# Patient Record
Sex: MALE | Race: WHITE | HISPANIC OR LATINO | Employment: OTHER | URBAN - METROPOLITAN AREA
[De-identification: names, ages, dates, MRNs, and addresses within clinical notes are randomized per-mention and may not be internally consistent; named-entity substitution may affect disease eponyms.]

---

## 2023-07-15 ENCOUNTER — HOSPITAL ENCOUNTER (EMERGENCY)
Facility: CLINIC | Age: 23
Discharge: HOME | End: 2023-07-15
Attending: EMERGENCY MEDICINE | Admitting: EMERGENCY MEDICINE
Payer: COMMERCIAL

## 2023-07-15 VITALS
DIASTOLIC BLOOD PRESSURE: 60 MMHG | HEART RATE: 64 BPM | TEMPERATURE: 97.6 F | OXYGEN SATURATION: 100 % | RESPIRATION RATE: 14 BRPM | SYSTOLIC BLOOD PRESSURE: 115 MMHG

## 2023-07-15 DIAGNOSIS — R10.13 EPIGASTRIC PAIN: ICD-10-CM

## 2023-07-15 LAB
ALBUMIN SERPL BCG-MCNC: 4.4 G/DL (ref 3.5–5.2)
ALP SERPL-CCNC: 92 U/L (ref 40–129)
ALT SERPL W P-5'-P-CCNC: 34 U/L (ref 0–70)
ANION GAP SERPL CALCULATED.3IONS-SCNC: 10 MMOL/L (ref 7–15)
AST SERPL W P-5'-P-CCNC: 21 U/L (ref 0–45)
BASOPHILS # BLD AUTO: 0 10E3/UL (ref 0–0.2)
BASOPHILS NFR BLD AUTO: 0 %
BILIRUB SERPL-MCNC: 0.2 MG/DL
BUN SERPL-MCNC: 11.3 MG/DL (ref 6–20)
CALCIUM SERPL-MCNC: 9.2 MG/DL (ref 8.6–10)
CHLORIDE SERPL-SCNC: 103 MMOL/L (ref 98–107)
CREAT SERPL-MCNC: 0.7 MG/DL (ref 0.67–1.17)
DEPRECATED HCO3 PLAS-SCNC: 27 MMOL/L (ref 22–29)
EOSINOPHIL # BLD AUTO: 0.3 10E3/UL (ref 0–0.7)
EOSINOPHIL NFR BLD AUTO: 3 %
ERYTHROCYTE [DISTWIDTH] IN BLOOD BY AUTOMATED COUNT: 13.2 % (ref 10–15)
GFR SERPL CREATININE-BSD FRML MDRD: >90 ML/MIN/1.73M2
GLUCOSE SERPL-MCNC: 103 MG/DL (ref 70–99)
HCT VFR BLD AUTO: 42.5 % (ref 40–53)
HGB BLD-MCNC: 14.7 G/DL (ref 13.3–17.7)
IMM GRANULOCYTES # BLD: 0.1 10E3/UL
IMM GRANULOCYTES NFR BLD: 1 %
LIPASE SERPL-CCNC: 25 U/L (ref 13–60)
LYMPHOCYTES # BLD AUTO: 2.2 10E3/UL (ref 0.8–5.3)
LYMPHOCYTES NFR BLD AUTO: 22 %
MCH RBC QN AUTO: 28.5 PG (ref 26.5–33)
MCHC RBC AUTO-ENTMCNC: 34.6 G/DL (ref 31.5–36.5)
MCV RBC AUTO: 83 FL (ref 78–100)
MONOCYTES # BLD AUTO: 0.6 10E3/UL (ref 0–1.3)
MONOCYTES NFR BLD AUTO: 6 %
NEUTROPHILS # BLD AUTO: 6.9 10E3/UL (ref 1.6–8.3)
NEUTROPHILS NFR BLD AUTO: 68 %
NRBC # BLD AUTO: 0 10E3/UL
NRBC BLD AUTO-RTO: 0 /100
PLATELET # BLD AUTO: 348 10E3/UL (ref 150–450)
POTASSIUM SERPL-SCNC: 4.1 MMOL/L (ref 3.4–5.3)
PROT SERPL-MCNC: 7.5 G/DL (ref 6.4–8.3)
RBC # BLD AUTO: 5.15 10E6/UL (ref 4.4–5.9)
SODIUM SERPL-SCNC: 140 MMOL/L (ref 136–145)
WBC # BLD AUTO: 10.1 10E3/UL (ref 4–11)

## 2023-07-15 PROCEDURE — 80053 COMPREHEN METABOLIC PANEL: CPT | Performed by: EMERGENCY MEDICINE

## 2023-07-15 PROCEDURE — 83690 ASSAY OF LIPASE: CPT | Performed by: EMERGENCY MEDICINE

## 2023-07-15 PROCEDURE — 85025 COMPLETE CBC W/AUTO DIFF WBC: CPT | Performed by: EMERGENCY MEDICINE

## 2023-07-15 PROCEDURE — 99283 EMERGENCY DEPT VISIT LOW MDM: CPT

## 2023-07-15 PROCEDURE — 36415 COLL VENOUS BLD VENIPUNCTURE: CPT | Performed by: EMERGENCY MEDICINE

## 2023-07-15 ASSESSMENT — ACTIVITIES OF DAILY LIVING (ADL): ADLS_ACUITY_SCORE: 35

## 2023-07-15 NOTE — ED PROVIDER NOTES
History     Chief Complaint:  Abdominal Pain       HPI   Julio Stoll is a 22 year old male who presents with epigastric abdominal pain.  He states the pain began around 2300 tonight.  He initially had some nausea with the discomfort.  His mother gave him a dose of omeprazole but given the pain he came to the ER.  He currently states his pain is completely resolved.  He has not had any fevers, difficulty urinating, or change in bowel movements.  He states that he ate a [employer] for dinner tonight.      Allergies:  No Known Allergies     Medications:    No current outpatient medications on file.      Past Medical History:    History reviewed. No pertinent past medical history.    Past Surgical History:    History reviewed. No pertinent surgical history.     Family History:    family history is not on file.    Social History:   reports that he has never smoked. He has quit using smokeless tobacco.  PCP: No Ref-Primary, Physician     Physical Exam   Patient Vitals for the past 24 hrs:   BP Temp Temp src Pulse Resp SpO2   07/15/23 0129 114/74 97.6  F (36.4  C) Temporal 56 14 100 %        Physical Exam  General: Appears well-developed and well-nourished.   Head: No signs of trauma.   CV: Normal rate and regular rhythm.    Resp: Effort normal and breath sounds normal. No respiratory distress.   GI: Soft. There is no tenderness.  No rebound or guarding.  Normal bowel sounds.  No CVA tenderness.  MSK: Normal range of motion.   Neuro: The patient is alert and oriented. Sensation normal. Speech normal.  Skin: Skin is warm and dry. No rash noted.   Psych: normal mood and affect. behavior is normal.       Emergency Department Course   Laboratory:  Labs Ordered and Resulted from Time of ED Arrival to Time of ED Departure   COMPREHENSIVE METABOLIC PANEL - Abnormal       Result Value    Sodium 140      Potassium 4.1      Chloride 103      Carbon Dioxide (CO2) 27      Anion Gap 10      Urea Nitrogen 11.3      Creatinine  0.70      Calcium 9.2      Glucose 103 (*)     Alkaline Phosphatase 92      AST 21      ALT 34      Protein Total 7.5      Albumin 4.4      Bilirubin Total 0.2      GFR Estimate >90     LIPASE - Normal    Lipase 25     CBC WITH PLATELETS AND DIFFERENTIAL    WBC Count 10.1      RBC Count 5.15      Hemoglobin 14.7      Hematocrit 42.5      MCV 83      MCH 28.5      MCHC 34.6      RDW 13.2      Platelet Count 348      % Neutrophils 68      % Lymphocytes 22      % Monocytes 6      % Eosinophils 3      % Basophils 0      % Immature Granulocytes 1      NRBCs per 100 WBC 0      Absolute Neutrophils 6.9      Absolute Lymphocytes 2.2      Absolute Monocytes 0.6      Absolute Eosinophils 0.3      Absolute Basophils 0.0      Absolute Immature Granulocytes 0.1      Absolute NRBCs 0.0            Emergency Department Course & Assessments:    Interventions:  Medications - No data to display       Social Determinants of Health affecting care:   None    Disposition:  The patient was discharged to home.     Impression & Plan    Medical Decision Making:  Julio Stoll is a 22 year old male who presents with epigastric abdominal pain.  He reports that the pain began around 2300 tonight with some nausea but no vomiting.  His mother gave him a dose of omeprazole.  He states that his pain has currently completely resolved.  On my evaluation he had a benign abdominal exam with no pain with palpation throughout.  Blood work was obtained that was reassuring.  I considered a CT scan, but given the reassuring physical exam and blood work, I did not feel that this was indicated.  I did discuss that his symptoms could be related to gastritis, but there is no test for this.  I recommended he continue with the omeprazole for few more days and have a simple diet avoiding fatty foods, spicy foods, and alcohol.  Recommended he follow with his doctor in clinic and return to the ER for any further concerns.      Diagnosis:    ICD-10-CM    1.  Epigastric pain  R10.13            Discharge Medications:  New Prescriptions    No medications on file           Dilan Orozco MD  07/15/23 0330

## 2023-07-15 NOTE — DISCHARGE INSTRUCTIONS
Please take the omeprazole once per day for the next week.  Return to the ER for worsening pain, fevers, or any further concerns.

## 2023-07-15 NOTE — ED TRIAGE NOTES
Pt reports upper gastric pain about 35 minutes ago that feels like its being squeezed. Pt rates pain a 6/10.     Triage Assessment     Row Name 07/15/23 0132       Triage Assessment (Adult)    Airway WDL WDL       Respiratory WDL    Respiratory WDL WDL       Skin Circulation/Temperature WDL    Skin Circulation/Temperature WDL WDL       Cardiac WDL    Cardiac WDL WDL       Peripheral/Neurovascular WDL    Peripheral Neurovascular WDL WDL       Cognitive/Neuro/Behavioral WDL    Cognitive/Neuro/Behavioral WDL WDL

## 2024-06-24 ENCOUNTER — APPOINTMENT (OUTPATIENT)
Dept: GENERAL RADIOLOGY | Facility: CLINIC | Age: 24
End: 2024-06-24
Attending: EMERGENCY MEDICINE
Payer: OTHER GOVERNMENT

## 2024-06-24 ENCOUNTER — HOSPITAL ENCOUNTER (EMERGENCY)
Facility: CLINIC | Age: 24
Discharge: HOME | End: 2024-06-25
Attending: EMERGENCY MEDICINE | Admitting: EMERGENCY MEDICINE
Payer: OTHER GOVERNMENT

## 2024-06-24 DIAGNOSIS — R07.9 CHEST PAIN, UNSPECIFIED TYPE: ICD-10-CM

## 2024-06-24 LAB
ALBUMIN SERPL BCG-MCNC: 4.4 G/DL (ref 3.5–5.2)
ALP SERPL-CCNC: 75 U/L (ref 40–150)
ALT SERPL W P-5'-P-CCNC: 17 U/L (ref 0–70)
ANION GAP SERPL CALCULATED.3IONS-SCNC: 7 MMOL/L (ref 7–15)
AST SERPL W P-5'-P-CCNC: 16 U/L (ref 0–45)
ATRIAL RATE - MUSE: 58 BPM
BASOPHILS # BLD AUTO: 0.1 10E3/UL (ref 0–0.2)
BASOPHILS NFR BLD AUTO: 1 %
BILIRUB SERPL-MCNC: <0.2 MG/DL
BUN SERPL-MCNC: 15.7 MG/DL (ref 6–20)
CALCIUM SERPL-MCNC: 9.4 MG/DL (ref 8.6–10)
CHLORIDE SERPL-SCNC: 104 MMOL/L (ref 98–107)
CREAT SERPL-MCNC: 1.08 MG/DL (ref 0.67–1.17)
DEPRECATED HCO3 PLAS-SCNC: 29 MMOL/L (ref 22–29)
DIASTOLIC BLOOD PRESSURE - MUSE: NORMAL MMHG
EGFRCR SERPLBLD CKD-EPI 2021: >90 ML/MIN/1.73M2
EOSINOPHIL # BLD AUTO: 0.5 10E3/UL (ref 0–0.7)
EOSINOPHIL NFR BLD AUTO: 6 %
ERYTHROCYTE [DISTWIDTH] IN BLOOD BY AUTOMATED COUNT: 13.1 % (ref 10–15)
GLUCOSE SERPL-MCNC: 90 MG/DL (ref 70–99)
HCT VFR BLD AUTO: 41.5 % (ref 40–53)
HGB BLD-MCNC: 14 G/DL (ref 13.3–17.7)
IMM GRANULOCYTES # BLD: 0 10E3/UL
IMM GRANULOCYTES NFR BLD: 0 %
INTERPRETATION ECG - MUSE: NORMAL
LIPASE SERPL-CCNC: 34 U/L (ref 13–60)
LYMPHOCYTES # BLD AUTO: 3.7 10E3/UL (ref 0.8–5.3)
LYMPHOCYTES NFR BLD AUTO: 49 %
MCH RBC QN AUTO: 29.1 PG (ref 26.5–33)
MCHC RBC AUTO-ENTMCNC: 33.7 G/DL (ref 31.5–36.5)
MCV RBC AUTO: 86 FL (ref 78–100)
MONOCYTES # BLD AUTO: 0.7 10E3/UL (ref 0–1.3)
MONOCYTES NFR BLD AUTO: 9 %
NEUTROPHILS # BLD AUTO: 2.7 10E3/UL (ref 1.6–8.3)
NEUTROPHILS NFR BLD AUTO: 35 %
NRBC # BLD AUTO: 0 10E3/UL
NRBC BLD AUTO-RTO: 0 /100
P AXIS - MUSE: 52 DEGREES
PLATELET # BLD AUTO: 302 10E3/UL (ref 150–450)
POTASSIUM SERPL-SCNC: 4 MMOL/L (ref 3.4–5.3)
PR INTERVAL - MUSE: 172 MS
PROT SERPL-MCNC: 7.2 G/DL (ref 6.4–8.3)
QRS DURATION - MUSE: 86 MS
QT - MUSE: 408 MS
QTC - MUSE: 400 MS
R AXIS - MUSE: 83 DEGREES
RBC # BLD AUTO: 4.81 10E6/UL (ref 4.4–5.9)
SODIUM SERPL-SCNC: 140 MMOL/L (ref 135–145)
SYSTOLIC BLOOD PRESSURE - MUSE: NORMAL MMHG
T AXIS - MUSE: 53 DEGREES
TROPONIN T SERPL HS-MCNC: <6 NG/L
VENTRICULAR RATE- MUSE: 58 BPM
WBC # BLD AUTO: 7.7 10E3/UL (ref 4–11)

## 2024-06-24 PROCEDURE — 85025 COMPLETE CBC W/AUTO DIFF WBC: CPT | Performed by: EMERGENCY MEDICINE

## 2024-06-24 PROCEDURE — 36415 COLL VENOUS BLD VENIPUNCTURE: CPT | Performed by: EMERGENCY MEDICINE

## 2024-06-24 PROCEDURE — 93005 ELECTROCARDIOGRAM TRACING: CPT

## 2024-06-24 PROCEDURE — 84484 ASSAY OF TROPONIN QUANT: CPT | Performed by: EMERGENCY MEDICINE

## 2024-06-24 PROCEDURE — 80053 COMPREHEN METABOLIC PANEL: CPT | Performed by: EMERGENCY MEDICINE

## 2024-06-24 PROCEDURE — 83690 ASSAY OF LIPASE: CPT | Performed by: EMERGENCY MEDICINE

## 2024-06-24 PROCEDURE — 99285 EMERGENCY DEPT VISIT HI MDM: CPT | Mod: 25

## 2024-06-24 PROCEDURE — 96374 THER/PROPH/DIAG INJ IV PUSH: CPT

## 2024-06-24 PROCEDURE — 71046 X-RAY EXAM CHEST 2 VIEWS: CPT

## 2024-06-24 PROCEDURE — 82040 ASSAY OF SERUM ALBUMIN: CPT | Performed by: EMERGENCY MEDICINE

## 2024-06-24 ASSESSMENT — ACTIVITIES OF DAILY LIVING (ADL)
ADLS_ACUITY_SCORE: 35
ADLS_ACUITY_SCORE: 35

## 2024-06-24 ASSESSMENT — COLUMBIA-SUICIDE SEVERITY RATING SCALE - C-SSRS
6. HAVE YOU EVER DONE ANYTHING, STARTED TO DO ANYTHING, OR PREPARED TO DO ANYTHING TO END YOUR LIFE?: NO
2. HAVE YOU ACTUALLY HAD ANY THOUGHTS OF KILLING YOURSELF IN THE PAST MONTH?: NO
1. IN THE PAST MONTH, HAVE YOU WISHED YOU WERE DEAD OR WISHED YOU COULD GO TO SLEEP AND NOT WAKE UP?: NO

## 2024-06-25 VITALS
OXYGEN SATURATION: 100 % | TEMPERATURE: 97.4 F | SYSTOLIC BLOOD PRESSURE: 125 MMHG | HEART RATE: 53 BPM | DIASTOLIC BLOOD PRESSURE: 78 MMHG | RESPIRATION RATE: 20 BRPM

## 2024-06-25 LAB — TROPONIN T SERPL HS-MCNC: <6 NG/L

## 2024-06-25 PROCEDURE — 84484 ASSAY OF TROPONIN QUANT: CPT | Performed by: EMERGENCY MEDICINE

## 2024-06-25 PROCEDURE — 250N000013 HC RX MED GY IP 250 OP 250 PS 637: Performed by: EMERGENCY MEDICINE

## 2024-06-25 PROCEDURE — 250N000011 HC RX IP 250 OP 636: Mod: JZ | Performed by: EMERGENCY MEDICINE

## 2024-06-25 PROCEDURE — 36415 COLL VENOUS BLD VENIPUNCTURE: CPT | Performed by: EMERGENCY MEDICINE

## 2024-06-25 RX ORDER — OMEPRAZOLE 40 MG/1
40 CAPSULE, DELAYED RELEASE ORAL DAILY
Qty: 14 CAPSULE | Refills: 0 | Status: SHIPPED | OUTPATIENT
Start: 2024-06-25 | End: 2024-07-09

## 2024-06-25 RX ORDER — ACETAMINOPHEN 500 MG
1000 TABLET ORAL ONCE
Status: COMPLETED | OUTPATIENT
Start: 2024-06-25 | End: 2024-06-25

## 2024-06-25 RX ORDER — MAGNESIUM HYDROXIDE/ALUMINUM HYDROXICE/SIMETHICONE 120; 1200; 1200 MG/30ML; MG/30ML; MG/30ML
15 SUSPENSION ORAL ONCE
Status: COMPLETED | OUTPATIENT
Start: 2024-06-25 | End: 2024-06-25

## 2024-06-25 RX ADMIN — FAMOTIDINE 20 MG: 10 INJECTION, SOLUTION INTRAVENOUS at 01:31

## 2024-06-25 RX ADMIN — ACETAMINOPHEN 1000 MG: 500 TABLET, FILM COATED ORAL at 01:31

## 2024-06-25 RX ADMIN — ALUMINUM HYDROXIDE, MAGNESIUM HYDROXIDE, AND SIMETHICONE 15 ML: 200; 200; 20 SUSPENSION ORAL at 01:31

## 2024-06-25 ASSESSMENT — ACTIVITIES OF DAILY LIVING (ADL): ADLS_ACUITY_SCORE: 35

## 2024-06-25 NOTE — DISCHARGE INSTRUCTIONS
Your evaluation tonight is reassuring.  It is possible this is related to stomach acid, although there is no test for this in the emergency department.  If the symptoms continue, please follow-up with your doctor in clinic.  Return to the ER for worsening symptoms or further concerns.

## 2024-06-25 NOTE — ED TRIAGE NOTES
Patient presents to the ER for complaints of left sided chest pain that he woke up with. Patient states that it is 8/10 and has not improved even with OTC medications. Endorses S[facility]O.      Triage Assessment (Adult)       Row Name 06/24/24 2465          Triage Assessment    Airway WDL WDL        Respiratory WDL    Respiratory WDL X;rhythm/pattern     Rhythm/Pattern, Respiratory dyspnea upon exertion        Skin Circulation/Temperature WDL    Skin Circulation/Temperature WDL WDL        Cardiac WDL    Cardiac WDL X        Peripheral/Neurovascular WDL    Peripheral Neurovascular WDL WDL        Cognitive/Neuro/Behavioral WDL    Cognitive/Neuro/Behavioral WDL WDL

## 2024-06-25 NOTE — ED PROVIDER NOTES
Emergency Department Note      History of Present Illness     Chief Complaint  Chest Pain    HPI  Julio tSoll is a 23 year old male who presents to the ED for chest pain. Yesterday morning, the patient woke up with chest pain that he localizes to the left side of his chest, underneath his pectoral. He felt fine when he went to bed on Sunday. The pain was most severe in the morning and has subsided a bit throughout the day. He took some Advil at 1130 with no relief. The pain does not change with eating. Patient denies cough, congestion, or sore throat. No reported allergies.     Independent Historian  None    Review of External Notes  None  Past Medical History   Medical History and Problem List  No other significant past medical history or family history.    Medications  The patient is currently on no regular medications.    Physical Exam   Patient Vitals for the past 24 hrs:   BP Temp Temp src Pulse Resp SpO2   06/25/24 0116 -- -- -- -- -- 100 %   06/25/24 0115 125/78 -- -- 53 -- --   06/24/24 2149 129/51 -- -- 54 20 100 %   06/24/24 2147 -- 97.4  F (36.3  C) Temporal -- -- --     Physical Exam  General: Appears well-developed and well-nourished.   Head: No signs of trauma.   CV: Normal rate and regular rhythm.    Resp: Effort normal and breath sounds normal. No respiratory distress.   GI: Soft. There is no tenderness.  No rebound or guarding.  Normal bowel sounds.  MSK: Normal range of motion. no edema. No Calf tenderness.  Neuro: The patient is alert and oriented. Speech normal.  Skin: Skin is warm and dry. No rash noted.   Psych: normal mood and affect. behavior is normal.     Diagnostics   Lab Results   Labs Ordered and Resulted from Time of ED Arrival to Time of ED Departure   COMPREHENSIVE METABOLIC PANEL - Normal       Result Value    Sodium 140      Potassium 4.0      Carbon Dioxide (CO2) 29      Anion Gap 7      Urea Nitrogen 15.7      Creatinine 1.08      GFR Estimate >90      Calcium 9.4       Chloride 104      Glucose 90      Alkaline Phosphatase 75      AST 16      ALT 17      Protein Total 7.2      Albumin 4.4      Bilirubin Total <0.2     TROPONIN T, HIGH SENSITIVITY - Normal    Troponin T, High Sensitivity <6     LIPASE - Normal    Lipase 34     CBC WITH PLATELETS AND DIFFERENTIAL    WBC Count 7.7      RBC Count 4.81      Hemoglobin 14.0      Hematocrit 41.5      MCV 86      MCH 29.1      MCHC 33.7      RDW 13.1      Platelet Count 302      % Neutrophils 35      % Lymphocytes 49      % Monocytes 9      % Eosinophils 6      % Basophils 1      % Immature Granulocytes 0      NRBCs per 100 WBC 0      Absolute Neutrophils 2.7      Absolute Lymphocytes 3.7      Absolute Monocytes 0.7      Absolute Eosinophils 0.5      Absolute Basophils 0.1      Absolute Immature Granulocytes 0.0      Absolute NRBCs 0.0       Imaging  XR Chest 2 Views   Final Result   IMPRESSION: Negative chest.        EKG   ECG taken at 2150, ECG read at 0120  Sinus bradycardia  Otherwise normal ECG   Rate 58 bpm. HI interval 172 ms. QRS duration 86 ms. QT/QTc 408/400 ms. P-R-T axes 52 83 53.    Independent Interpretation  On my independent interpretation of CXR there is no pneumothorax    ED Course    Medications Administered  Medications   acetaminophen (TYLENOL) tablet 1,000 mg (1,000 mg Oral $Given 6/25/24 0131)   alum & mag hydroxide-simethicone (MAALOX) suspension 15 mL (15 mLs Oral $Given 6/25/24 0131)   famotidine (PEPCID) injection 20 mg (20 mg Intravenous $Given 6/25/24 0131)       Discussion of Management  None    Social Determinants of Health adding to complexity of care  None    ED Course  ED Course as of 06/25/24 0454   Tue Jun 25, 2024   0120 I obtained history and examined the patient as noted above.       Medical Decision Making / Diagnosis   JONNY  Julio Stoll is a 23 year old male presents with chest pain.  States he had chest pain throughout the day.  On my evaluation he had a reassuring physical exam and vitals.   EKG did not show any concerning ST segment changes or arrhythmias and blood work was obtained including a troponin which was undetectable.  I did obtain abdominal labs as well which were reassuring.  Patient is low risk Wells and PERC negative.  He was given above medications and did feel better.  I did discuss with the patient that this time the exact cause of his symptoms is not clear, but at this point I do not see any emergent or life-threatening process.  I discussed that trying supportive care and recommended follow-up in clinic if the symptoms persist.  Return precautions were discussed.    Disposition  The patient was discharged.     ICD-10 Codes:    ICD-10-CM    1. Chest pain, unspecified type  R07.9            Discharge Medications  Discharge Medication List as of 6/25/2024  1:35 AM        START taking these medications    Details   omeprazole (PRILOSEC) 40 MG DR capsule Take 1 capsule (40 mg) by mouth daily for 14 days, Disp-14 capsule, R-0, E-Prescribe           Scribe Disclosure:  Thanh CREWS, am serving as a scribe at 1:19 AM on 6/25/2024 to document services personally performed by Dilan Orozco MD based on my observations and the provider's statements to me.        Dilan Orozco MD  06/25/24 0630

## 2024-07-23 ENCOUNTER — OFFICE VISIT (OUTPATIENT)
Dept: FAMILY MEDICINE | Facility: CLINIC | Age: 24
End: 2024-07-23
Payer: OTHER GOVERNMENT

## 2024-07-23 VITALS
RESPIRATION RATE: 16 BRPM | DIASTOLIC BLOOD PRESSURE: 79 MMHG | OXYGEN SATURATION: 97 % | HEART RATE: 65 BPM | HEIGHT: 68 IN | SYSTOLIC BLOOD PRESSURE: 125 MMHG | BODY MASS INDEX: 23.84 KG/M2 | WEIGHT: 157.3 LBS | TEMPERATURE: 99 F

## 2024-07-23 DIAGNOSIS — K59.00 CONSTIPATION, UNSPECIFIED CONSTIPATION TYPE: ICD-10-CM

## 2024-07-23 DIAGNOSIS — K21.9 GASTROESOPHAGEAL REFLUX DISEASE WITHOUT ESOPHAGITIS: Primary | ICD-10-CM

## 2024-07-23 PROCEDURE — 99213 OFFICE O/P EST LOW 20 MIN: CPT

## 2024-07-23 RX ORDER — SENNA AND DOCUSATE SODIUM 50; 8.6 MG/1; MG/1
1 TABLET, FILM COATED ORAL DAILY PRN
Qty: 30 TABLET | Refills: 0 | Status: SHIPPED | OUTPATIENT
Start: 2024-07-23 | End: 2024-08-21

## 2024-07-23 RX ORDER — POLYETHYLENE GLYCOL 3350 17 G/17G
17 POWDER, FOR SOLUTION ORAL DAILY
Qty: 510 G | Refills: 0 | Status: SHIPPED | OUTPATIENT
Start: 2024-07-23 | End: 2024-08-21

## 2024-07-23 ASSESSMENT — PAIN SCALES - GENERAL: PAINLEVEL: MODERATE PAIN (4)

## 2024-07-23 NOTE — PROGRESS NOTES
Assessment & Plan     (K21.9) Gastroesophageal reflux disease without esophagitis  (primary encounter diagnosis)  Comment: Acute and stable.  No concerns for acute airway, vital signs stable, no concerns for esophagitis or hemorrhagic changes.  Patient's presentation is classic for GERD with epigastric cramping and discomfort and burning sensation in chest and throat.  Some possibility that he is also experiencing a minor ulcer, but PPI therapy will help to manage this as well.  Will begin with increased dosing of 40 mg once daily for 2 weeks, and then reduce dosing to 20 mg once daily for the next month.  Therapy will help to manage this as well.  Will begin with increased dosing of 40 mg once daily for 2 weeks, and then reduce dosing to 20 mg once daily for the next month.  Educated patient that I would like him to follow-up to establish care with a PCP at that time, as this will be a good time to discuss the possibility for discontinuation of the medication versus more ongoing treatment. Offered education on medications including appropriate dosing, possible side effects, and possible adverse effects.  Education given on return to clinic instructions as well as alarm signs that would require the need for immediate medical attention.  Patient attested to understanding.  Educated patient on some foods that can aggravate reflux which include spicy foods, fried foods, greasy foods, and alcohol.  Patient attested to working towards avoiding these foods.  Plan: omeprazole (PRILOSEC) 20 MG DR capsule    (K59.00) Constipation, unspecified constipation type  Comment: Acute and stable.  No concerns for acute abdomen or bowel perforation.  No significant lower abdominal discomfort, but patient notes that he feels he has to strain more often to pass a bowel movement recently.  Will begin with MiraLAX and increase dietary fiber, but also sent patient with senna to entice a daily bowel movement if more conservative plan of  care is not beneficial. Offered education on medications including appropriate dosing, possible side effects, and possible adverse effects.  Education given on return to clinic instructions as well as alarm signs that would require the need for immediate medical attention.  Patient attested to understanding.  Plan: SENNA-docusate sodium (SENNA S) 8.6-50 MG         tablet, polyethylene glycol (MIRALAX) 17         GM/Dose powder      Prescription drug management  I spent a total of 15 minutes on the day of the visit.   Time spent by me doing chart review, history and exam, documentation and further activities per the note    FUTURE APPOINTMENTS:       - Follow-up visit in 1 month to establish care and recheck GI symptoms       - Follow-up for annual visit or as needed  Patient Instructions   To reduce acid production and start healing the stomach, take Omeprazole 40mg daily for the next 2 weeks. After that you can take 20mg once daily for the next month. This medication must be taken 30 MINUTES BEFORE a meal.  Your symptoms should improve greatly over the next 48-72 hours.  For your ongoing symptoms, please use Maalox every 4 hours as needed.  I recommend you follow up with your provider in two weeks if symptoms have not completely resolved.      Please take your medicines as recommended above and review the discharge instructions for concerning signs/symptoms that would require your prompt return to the clinic or emergency department for further evaluation. This would include severe worsening of pain, difficulty swallowing, vomiting, bloody vomit. Please follow up in clinic as I have recommended.  If your symptoms worsen prior to your follow up appointment, do not hesitate to return to clinic or go to an Emergency Department      For constipation:   1. Consume at least 8 glasses of water per day   2. Exercise for at least 30 minutes every day. Regular exercise helps to keep your digestive system active and and  healthy and may help with constipation.   3. Take advantage of opportunities - you are more like to have a bowel movement after waking and after eating. Do not postpone having a bowel movement if you feel the urge to go.   4. Increase dietary fiber. Goal of 25 grams per day for women, 35 grams per day for men. If unable to consume 25-35 grams through diet alone consider OTC supplements. Metamucil is the best choice. It requires the use of plenty of water to be effective. Can take days to weeks to take effect.   5. Prunes can be just as effective as Metamucil. 10 prunes = 6 grams of fiber.   6. Recommend taking Miralax (17 grams = 1 scoop). Take once daily, can mix with any liquid. If you experience increasing bowel movements and diarrhea, decrease to every other day or every 3rd day. Miralax is an osmotic laxative that increases the amount of water secreted by the intestines resulting in softer and easier to pass stools. It can take 1-4 days to work. It may be taken chronically if you drink enough water throughout the day.   7. If Miralax isn't enough, recommend stimulant laxative such as Senna, Ex Lax or Dulcolax. Stimulant laxatives speed up the colonic motility of your gut helping to induce a bowel movement. Take as needed at     High Fiber Foods:  Bran cereal, 1/3 cup, 8.6 gm fiber   Cooked kidney beans   cup 7.9 gm  Cooked lentils   cup 7.8 gm  Cooked black beans   cup 7.6 gm  Canned chickpeas   cup 5.3 gm  Baked beans   cup 5.2 gm  Pear 1 5.1 gm  Soybeans   cup 5.1 gm  Quinoa   cup 5 gm  Deep seeds, 1 tbsp 5 gm  Baked sweet potato, with skin 1 medium 4.8 gm  Avocado, half small 4.5 gm  Baked potato, with skin 1 medium 4.4 gm  Cooked frozen green peas   cup 4.4 gm  Bulgur   cup 4.1 gm  Cooked frozen mixed vegetables   cup 4 gm  Raspberries   cup 4 gm  Blackberries   cup 3.8 gm  Almonds 1 oz 3.5 gm  Cooked frozen spinach   cup 3.5 gm  Vegetable or soy mary 1 each 3.4 gm  Apple 1 medium 3.3 gm  Dried dates 5  pieces 3.3 gm      Subjective   Julio is a 23 year old, presenting for the following health issues:  Abdominal Pain and Recheck Medication (PT REPORTS THAT HE IS HERE TO DISCUSS HAVING PAIN IN STOMACH AREA FOR 3-4 DAYS.PT ALSO TOOK  GUT SUPPORT DIETARY SUPPLEMENT TO HELP WITH HAVING A BOWEL MOVEMENT.)        7/23/2024    11:17 AM   Additional Questions   Roomed by DORIAN   Accompanied by MOM         7/23/2024    11:17 AM   Patient Reported Additional Medications   Patient reports taking the following new medications NONE     History of Present Illness       Reason for visit:  Pain on the middle part of the stomach  Symptom onset:  3-7 days ago    He eats 2-3 servings of fruits and vegetables daily.He consumes 1 sweetened beverage(s) daily.He exercises with enough effort to increase his heart rate 60 or more minutes per day.  He exercises with enough effort to increase his heart rate 5 days per week.   Julio is a 23-year-old male with no significant past medical history who presents today accompanied by his mother with concerns for abdominal discomfort.  Patient reports that beginning on Sunday, July 21 he was having some significant epigastric cramping which resulted in a single episode of emesis.  His mom gave him some Pepto-Bismol which helped to improve the symptoms, but since that time he has continued to have epigastric cramping, and burning in his chest and throat.  He does not have a history of GERD, and declines that he has never needed medication to manage this, but does attest to the fact that he eats a significant amount of spicy foods.  Along with this, he does attest to some changes in his bowel habits.  He notes he is a once daily stool or, but notes that since he does not have a history of GERD, and declines that he has never needed medication to manage this, but does attest to the fact that he eats a significant amount of spicy foods.  Along with this, he does attest to some changes in his bowel  "habits.  He notes he is a once daily stool or, but notes that since the symptoms of epigastric pain have presented he feels he needs to strain more to pass a bowel movement.  He declines any blood in his stool or emesis, black tarry stools, lower abdominal pain, or persistent nausea and vomiting.  He feels completely well otherwise and declines any fever, chills, body aches, lightheadedness or dizziness, blurry or double vision, chest pain, shortness of breath, or general fatigue.       Review of Systems  Constitutional, HEENT, cardiovascular, pulmonary, gi and gu systems are negative, except as otherwise noted.      Objective    /79 (BP Location: Left arm, Patient Position: Sitting, Cuff Size: Adult Regular)   Pulse 65   Temp 99  F (37.2  C) (Oral)   Resp 16   Ht 1.727 m (5' 8\")   Wt 71.4 kg (157 lb 4.8 oz)   SpO2 97%   BMI 23.92 kg/m    Body mass index is 23.92 kg/m .  Physical Exam   GENERAL: alert and no distress  EYES: Eyes grossly normal to inspection, PERRL and conjunctivae and sclerae normal  HENT: ear canals and TM's normal, nose and mouth without ulcers or lesions  NECK: no adenopathy, no asymmetry, masses, or scars  RESP: lungs clear to auscultation - no rales, rhonchi or wheezes  CV: regular rate and rhythm, normal S1 S2, no S3 or S4, no murmur, click or rub, no peripheral edema  ABDOMEN: soft, nontender, no hepatosplenomegaly, no masses and bowel sounds normal  MS: no gross musculoskeletal defects noted, no edema    Juliana Tamayo DNP FNP-C  Family Nurse Practitioner - Same Day Provider  [facility] [place]        Signed Electronically by: DONNY Doshi CNP    "

## 2024-07-23 NOTE — PATIENT INSTRUCTIONS
To reduce acid production and start healing the stomach, take Omeprazole 40mg daily for the next 2 weeks. After that you can take 20mg once daily for the next month. This medication must be taken 30 MINUTES BEFORE a meal.  Your symptoms should improve greatly over the next 48-72 hours.  For your ongoing symptoms, please use Maalox every 4 hours as needed.  I recommend you follow up with your provider in two weeks if symptoms have not completely resolved.      Please take your medicines as recommended above and review the discharge instructions for concerning signs/symptoms that would require your prompt return to the clinic or emergency department for further evaluation. This would include severe worsening of pain, difficulty swallowing, vomiting, bloody vomit. Please follow up in clinic as I have recommended.  If your symptoms worsen prior to your follow up appointment, do not hesitate to return to clinic or go to an Emergency Department      For constipation:   1. Consume at least 8 glasses of water per day   2. Exercise for at least 30 minutes every day. Regular exercise helps to keep your digestive system active and and healthy and may help with constipation.   3. Take advantage of opportunities - you are more like to have a bowel movement after waking and after eating. Do not postpone having a bowel movement if you feel the urge to go.   4. Increase dietary fiber. Goal of 25 grams per day for women, 35 grams per day for men. If unable to consume 25-35 grams through diet alone consider OTC supplements. Metamucil is the best choice. It requires the use of plenty of water to be effective. Can take days to weeks to take effect.   5. Prunes can be just as effective as Metamucil. 10 prunes = 6 grams of fiber.   6. Recommend taking Miralax (17 grams = 1 scoop). Take once daily, can mix with any liquid. If you experience increasing bowel movements and diarrhea, decrease to every other day or every 3rd day. Miralax is  an osmotic laxative that increases the amount of water secreted by the intestines resulting in softer and easier to pass stools. It can take 1-4 days to work. It may be taken chronically if you drink enough water throughout the day.   7. If Miralax isn't enough, recommend stimulant laxative such as Senna, Ex Lax or Dulcolax. Stimulant laxatives speed up the colonic motility of your gut helping to induce a bowel movement. Take as needed at     High Fiber Foods:  Bran cereal, 1/3 cup, 8.6 gm fiber   Cooked kidney beans   cup 7.9 gm  Cooked lentils   cup 7.8 gm  Cooked black beans   cup 7.6 gm  Canned chickpeas   cup 5.3 gm  Baked beans   cup 5.2 gm  Pear 1 5.1 gm  Soybeans   cup 5.1 gm  Quinoa   cup 5 gm  Deep seeds, 1 tbsp 5 gm  Baked sweet potato, with skin 1 medium 4.8 gm  Avocado, half small 4.5 gm  Baked potato, with skin 1 medium 4.4 gm  Cooked frozen green peas   cup 4.4 gm  Bulgur   cup 4.1 gm  Cooked frozen mixed vegetables   cup 4 gm  Raspberries   cup 4 gm  Blackberries   cup 3.8 gm  Almonds 1 oz 3.5 gm  Cooked frozen spinach   cup 3.5 gm  Vegetable or soy mary 1 each 3.4 gm  Apple 1 medium 3.3 gm  Dried dates 5 pieces 3.3 gm

## 2024-07-23 NOTE — LETTER
July 23, 2024      Hari Jerman  [address] [address]        To Whom It May Concern:    Julio Stoll  was seen on 7/23/2024.  Please excuse him from missed hours of work today due to illness.        Sincerely,        DONNY Doshi CNP

## 2024-08-21 ENCOUNTER — OFFICE VISIT (OUTPATIENT)
Dept: FAMILY MEDICINE | Facility: CLINIC | Age: 24
End: 2024-08-21
Payer: OTHER GOVERNMENT

## 2024-08-21 VITALS
HEIGHT: 68 IN | HEART RATE: 75 BPM | BODY MASS INDEX: 24.25 KG/M2 | RESPIRATION RATE: 15 BRPM | WEIGHT: 160 LBS | SYSTOLIC BLOOD PRESSURE: 114 MMHG | DIASTOLIC BLOOD PRESSURE: 75 MMHG | TEMPERATURE: 98.5 F | OXYGEN SATURATION: 98 %

## 2024-08-21 DIAGNOSIS — K21.00 GASTROESOPHAGEAL REFLUX DISEASE WITH ESOPHAGITIS WITHOUT HEMORRHAGE: Primary | ICD-10-CM

## 2024-08-21 PROBLEM — Z86.19 HISTORY OF CHICKEN POX: Status: ACTIVE | Noted: 2017-02-13

## 2024-08-21 PROBLEM — N39.44 NOCTURNAL ENURESIS: Status: ACTIVE | Noted: 2022-07-19

## 2024-08-21 PROBLEM — Z72.821 HISTORY OF DIFFICULTY SLEEPING: Status: ACTIVE | Noted: 2017-01-18

## 2024-08-21 PROCEDURE — 99213 OFFICE O/P EST LOW 20 MIN: CPT

## 2024-08-21 NOTE — PATIENT INSTRUCTIONS
Continue omeprazole for an additional 2 weeks then stop.  If symptoms worsen again please follow up and we can discuss additional testing.

## 2024-08-21 NOTE — PROGRESS NOTES
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 Symptoms improving but still has some occasional sharp epigastric/chest discomfort. He was seen in the emergency department 6/24 for similar symptoms with cardiac etiology ruled out. As his symptoms are improving but not completely resolved will plan to extend omeprazole for an additional 14 days.  If symptoms return we discussed H.Pylori testing but would need to be off the omeprazole for several weeks prior to having this completed. Could also consider EGD if symptoms persist or worsen again.   - omeprazole (PRILOSEC) 20 MG DR capsule  Dispense: 14 capsule; Refill: 0    Plan to follow up if symptoms do not improve or worsen.        Richardson Kim is a 23 year old, presenting for the following health issues:  Follow Up (Follow up for stomach issues. Pain has subsided, but can feel pain on left side of chest not as often. Pain comes out of nowhere and Pt isn't doing anything in particular when he feels the pain.)      8/21/2024    10:20 AM   Additional Questions   Roomed by stuart mark   Accompanied by mom     History of Present Illness       Reason for visit:  Follow up    He eats 2-3 servings of fruits and vegetables daily.He consumes 1 sweetened beverage(s) daily.He exercises with enough effort to increase his heart rate 30 to 60 minutes per day.  He exercises with enough effort to increase his heart rate 5 days per week.   He is taking medications regularly.     Following up after being seen on 7/23 for acid reflux and constipation. Denies any constipation now. Has been taking omeprazole as prescribed. Is almost done with his course.   GERD symptoms overall improved significantly but will have occasional chest pain   Starts sponteously with no clear trigger, sharp pinching sensation that resolves.   No palpitations,  "flutters.  Denies any nausea, vomiting or shortness of breath.   Does not drink alcohol or use tobacco.    These symptoms started about a month after returning from the [employer].       Review of Systems  Constitutional, HEENT, cardiovascular, pulmonary, gi and gu systems are negative, except as otherwise noted.      Objective    /75 (BP Location: Left arm, Patient Position: Sitting, Cuff Size: Adult Regular)   Pulse 75   Temp 98.5  F (36.9  C) (Oral)   Resp 15   Ht 1.727 m (5' 8\")   Wt 72.6 kg (160 lb)   SpO2 98%   BMI 24.33 kg/m    Body mass index is 24.33 kg/m .  Physical Exam   GENERAL: alert and no distress  RESP: lungs clear to auscultation - no rales, rhonchi or wheezes  CV: regular rate and rhythm, normal S1 S2, no S3 or S4, no murmur, click or rub, no peripheral edema  PSYCH: mentation appears normal, affect normal/bright            Signed Electronically by: DONNY Camara CNP    "

## 2025-01-24 ENCOUNTER — LAB REQUISITION (OUTPATIENT)
Dept: LAB | Facility: CLINIC | Age: 25
End: 2025-01-24

## 2025-01-24 DIAGNOSIS — K29.00 ACUTE GASTRITIS WITHOUT BLEEDING: ICD-10-CM

## 2025-01-24 PROCEDURE — 80053 COMPREHEN METABOLIC PANEL: CPT | Performed by: FAMILY MEDICINE

## 2025-01-25 LAB
ALBUMIN SERPL BCG-MCNC: 4.7 G/DL (ref 3.5–5.2)
ALP SERPL-CCNC: 84 U/L (ref 40–150)
ALT SERPL W P-5'-P-CCNC: 39 U/L (ref 0–70)
ANION GAP SERPL CALCULATED.3IONS-SCNC: 11 MMOL/L (ref 7–15)
AST SERPL W P-5'-P-CCNC: 24 U/L (ref 0–45)
BILIRUB SERPL-MCNC: 0.4 MG/DL
BUN SERPL-MCNC: 13.2 MG/DL (ref 6–20)
CALCIUM SERPL-MCNC: 9.8 MG/DL (ref 8.8–10.4)
CHLORIDE SERPL-SCNC: 103 MMOL/L (ref 98–107)
CREAT SERPL-MCNC: 0.67 MG/DL (ref 0.67–1.17)
EGFRCR SERPLBLD CKD-EPI 2021: >90 ML/MIN/1.73M2
GLUCOSE SERPL-MCNC: 97 MG/DL (ref 70–99)
HCO3 SERPL-SCNC: 26 MMOL/L (ref 22–29)
POTASSIUM SERPL-SCNC: 4.1 MMOL/L (ref 3.4–5.3)
PROT SERPL-MCNC: 8.3 G/DL (ref 6.4–8.3)
SODIUM SERPL-SCNC: 140 MMOL/L (ref 135–145)

## 2025-02-19 ENCOUNTER — APPOINTMENT (OUTPATIENT)
Dept: CT IMAGING | Facility: HOSPITAL | Age: 25
End: 2025-02-19
Attending: EMERGENCY MEDICINE

## 2025-02-19 ENCOUNTER — APPOINTMENT (OUTPATIENT)
Dept: RADIOLOGY | Facility: HOSPITAL | Age: 25
End: 2025-02-19
Attending: INTERNAL MEDICINE

## 2025-02-19 ENCOUNTER — APPOINTMENT (OUTPATIENT)
Dept: ULTRASOUND IMAGING | Facility: HOSPITAL | Age: 25
End: 2025-02-19
Attending: EMERGENCY MEDICINE

## 2025-02-19 ENCOUNTER — HOSPITAL ENCOUNTER (INPATIENT)
Facility: HOSPITAL | Age: 25
End: 2025-02-19
Attending: EMERGENCY MEDICINE
Payer: COMMERCIAL

## 2025-02-19 DIAGNOSIS — K80.50 CHOLEDOCHOLITHIASIS: Primary | ICD-10-CM

## 2025-02-19 DIAGNOSIS — R79.89 ABNORMAL LFTS: ICD-10-CM

## 2025-02-19 DIAGNOSIS — K85.90 ACUTE PANCREATITIS, UNSPECIFIED COMPLICATION STATUS, UNSPECIFIED PANCREATITIS TYPE: ICD-10-CM

## 2025-02-19 DIAGNOSIS — R10.13 ABDOMINAL PAIN, EPIGASTRIC: ICD-10-CM

## 2025-02-19 DIAGNOSIS — D72.829 LEUKOCYTOSIS, UNSPECIFIED TYPE: ICD-10-CM

## 2025-02-19 DIAGNOSIS — K85.10 GALLSTONE PANCREATITIS: ICD-10-CM

## 2025-02-19 DIAGNOSIS — R18.8 OTHER ASCITES: ICD-10-CM

## 2025-02-19 LAB
ALBUMIN SERPL BCG-MCNC: 4.8 G/DL (ref 3.5–5.2)
ALBUMIN UR-MCNC: 20 MG/DL
ALP SERPL-CCNC: 178 U/L (ref 40–150)
ALT SERPL W P-5'-P-CCNC: 622 U/L (ref 0–70)
ANION GAP SERPL CALCULATED.3IONS-SCNC: 12 MMOL/L (ref 7–15)
APPEARANCE UR: CLEAR
AST SERPL W P-5'-P-CCNC: 245 U/L (ref 0–45)
BASOPHILS # BLD AUTO: 0.1 10E3/UL (ref 0–0.2)
BASOPHILS NFR BLD AUTO: 0 %
BILIRUB SERPL-MCNC: 1.1 MG/DL
BILIRUB UR QL STRIP: NEGATIVE
BUN SERPL-MCNC: 12.8 MG/DL (ref 6–20)
CA-I BLD-MCNC: 2.8 MG/DL (ref 4.4–5.2)
CA-I BLD-MCNC: 4.8 MG/DL (ref 4.4–5.2)
CALCIUM SERPL-MCNC: 10 MG/DL (ref 8.8–10.4)
CHLORIDE SERPL-SCNC: 101 MMOL/L (ref 98–107)
CHOLEST SERPL-MCNC: 172 MG/DL
COLOR UR AUTO: ABNORMAL
CREAT SERPL-MCNC: 0.72 MG/DL (ref 0.67–1.17)
EGFRCR SERPLBLD CKD-EPI 2021: >90 ML/MIN/1.73M2
EOSINOPHIL # BLD AUTO: 0 10E3/UL (ref 0–0.7)
EOSINOPHIL NFR BLD AUTO: 0 %
ERYTHROCYTE [DISTWIDTH] IN BLOOD BY AUTOMATED COUNT: 13.6 % (ref 10–15)
EST. AVERAGE GLUCOSE BLD GHB EST-MCNC: 111 MG/DL
ETHANOL SERPL-MCNC: <0.01 G/DL
GLUCOSE SERPL-MCNC: 164 MG/DL (ref 70–99)
GLUCOSE UR STRIP-MCNC: NEGATIVE MG/DL
HBA1C MFR BLD: 5.5 %
HCO3 SERPL-SCNC: 27 MMOL/L (ref 22–29)
HCT VFR BLD AUTO: 51.1 % (ref 40–53)
HDLC SERPL-MCNC: 50 MG/DL
HGB BLD-MCNC: 17.1 G/DL (ref 13.3–17.7)
HGB UR QL STRIP: NEGATIVE
IMM GRANULOCYTES # BLD: 0.2 10E3/UL
IMM GRANULOCYTES NFR BLD: 1 %
KETONES UR STRIP-MCNC: 40 MG/DL
LACTATE SERPL-SCNC: 0.9 MMOL/L (ref 0.7–2)
LACTATE SERPL-SCNC: 1.6 MMOL/L (ref 0.7–2)
LDLC SERPL CALC-MCNC: 94 MG/DL
LEUKOCYTE ESTERASE UR QL STRIP: NEGATIVE
LIPASE SERPL-CCNC: >3000 U/L (ref 13–60)
LYMPHOCYTES # BLD AUTO: 1 10E3/UL (ref 0.8–5.3)
LYMPHOCYTES NFR BLD AUTO: 4 %
MCH RBC QN AUTO: 28.5 PG (ref 26.5–33)
MCHC RBC AUTO-ENTMCNC: 33.5 G/DL (ref 31.5–36.5)
MCV RBC AUTO: 85 FL (ref 78–100)
MONOCYTES # BLD AUTO: 1.1 10E3/UL (ref 0–1.3)
MONOCYTES NFR BLD AUTO: 4 %
MUCOUS THREADS #/AREA URNS LPF: PRESENT /LPF
NEUTROPHILS # BLD AUTO: 23.2 10E3/UL (ref 1.6–8.3)
NEUTROPHILS NFR BLD AUTO: 91 %
NITRATE UR QL: NEGATIVE
NONHDLC SERPL-MCNC: 122 MG/DL
NRBC # BLD AUTO: 0 10E3/UL
NRBC BLD AUTO-RTO: 0 /100
PH UR STRIP: 6 [PH] (ref 5–7)
PLATELET # BLD AUTO: 405 10E3/UL (ref 150–450)
POTASSIUM SERPL-SCNC: 4 MMOL/L (ref 3.4–5.3)
PROT SERPL-MCNC: 8.2 G/DL (ref 6.4–8.3)
RBC # BLD AUTO: 6 10E6/UL (ref 4.4–5.9)
RBC URINE: 1 /HPF
SODIUM SERPL-SCNC: 140 MMOL/L (ref 135–145)
SP GR UR STRIP: 1 (ref 1–1.03)
TRIGL SERPL-MCNC: 139 MG/DL
UROBILINOGEN UR STRIP-MCNC: <2 MG/DL
WBC # BLD AUTO: 25.5 10E3/UL (ref 4–11)
WBC URINE: 1 /HPF

## 2025-02-19 PROCEDURE — 87149 DNA/RNA DIRECT PROBE: CPT | Performed by: EMERGENCY MEDICINE

## 2025-02-19 PROCEDURE — 74177 CT ABD & PELVIS W/CONTRAST: CPT

## 2025-02-19 PROCEDURE — 83605 ASSAY OF LACTIC ACID: CPT | Performed by: INTERNAL MEDICINE

## 2025-02-19 PROCEDURE — 81003 URINALYSIS AUTO W/O SCOPE: CPT | Performed by: EMERGENCY MEDICINE

## 2025-02-19 PROCEDURE — 250N000011 HC RX IP 250 OP 636: Mod: JZ | Performed by: EMERGENCY MEDICINE

## 2025-02-19 PROCEDURE — 250N000011 HC RX IP 250 OP 636: Performed by: EMERGENCY MEDICINE

## 2025-02-19 PROCEDURE — 82330 ASSAY OF CALCIUM: CPT | Performed by: EMERGENCY MEDICINE

## 2025-02-19 PROCEDURE — 250N000013 HC RX MED GY IP 250 OP 250 PS 637: Performed by: EMERGENCY MEDICINE

## 2025-02-19 PROCEDURE — 83036 HEMOGLOBIN GLYCOSYLATED A1C: CPT | Performed by: EMERGENCY MEDICINE

## 2025-02-19 PROCEDURE — 96365 THER/PROPH/DIAG IV INF INIT: CPT | Mod: 59

## 2025-02-19 PROCEDURE — 99285 EMERGENCY DEPT VISIT HI MDM: CPT | Mod: 25

## 2025-02-19 PROCEDURE — 99223 1ST HOSP IP/OBS HIGH 75: CPT | Performed by: INTERNAL MEDICINE

## 2025-02-19 PROCEDURE — 85004 AUTOMATED DIFF WBC COUNT: CPT | Performed by: EMERGENCY MEDICINE

## 2025-02-19 PROCEDURE — 71045 X-RAY EXAM CHEST 1 VIEW: CPT

## 2025-02-19 PROCEDURE — 80053 COMPREHEN METABOLIC PANEL: CPT | Performed by: EMERGENCY MEDICINE

## 2025-02-19 PROCEDURE — 120N000001 HC R&B MED SURG/OB

## 2025-02-19 PROCEDURE — 87040 BLOOD CULTURE FOR BACTERIA: CPT | Performed by: EMERGENCY MEDICINE

## 2025-02-19 PROCEDURE — 80061 LIPID PANEL: CPT | Performed by: EMERGENCY MEDICINE

## 2025-02-19 PROCEDURE — 250N000009 HC RX 250: Performed by: INTERNAL MEDICINE

## 2025-02-19 PROCEDURE — 96361 HYDRATE IV INFUSION ADD-ON: CPT

## 2025-02-19 PROCEDURE — 36415 COLL VENOUS BLD VENIPUNCTURE: CPT | Performed by: EMERGENCY MEDICINE

## 2025-02-19 PROCEDURE — 96375 TX/PRO/DX INJ NEW DRUG ADDON: CPT

## 2025-02-19 PROCEDURE — 258N000003 HC RX IP 258 OP 636: Performed by: EMERGENCY MEDICINE

## 2025-02-19 PROCEDURE — 76705 ECHO EXAM OF ABDOMEN: CPT

## 2025-02-19 PROCEDURE — 36415 COLL VENOUS BLD VENIPUNCTURE: CPT | Performed by: INTERNAL MEDICINE

## 2025-02-19 PROCEDURE — 83690 ASSAY OF LIPASE: CPT | Performed by: EMERGENCY MEDICINE

## 2025-02-19 PROCEDURE — 83605 ASSAY OF LACTIC ACID: CPT | Performed by: EMERGENCY MEDICINE

## 2025-02-19 PROCEDURE — 82077 ASSAY SPEC XCP UR&BREATH IA: CPT | Performed by: EMERGENCY MEDICINE

## 2025-02-19 RX ORDER — CALCIUM CARBONATE 500 MG/1
1000 TABLET, CHEWABLE ORAL 4 TIMES DAILY PRN
Status: DISCONTINUED | OUTPATIENT
Start: 2025-02-19 | End: 2025-02-25 | Stop reason: HOSPADM

## 2025-02-19 RX ORDER — PIPERACILLIN SODIUM, TAZOBACTAM SODIUM 3; .375 G/15ML; G/15ML
3.38 INJECTION, POWDER, LYOPHILIZED, FOR SOLUTION INTRAVENOUS ONCE
Status: COMPLETED | OUTPATIENT
Start: 2025-02-19 | End: 2025-02-19

## 2025-02-19 RX ORDER — MORPHINE SULFATE 4 MG/ML
4 INJECTION, SOLUTION INTRAMUSCULAR; INTRAVENOUS ONCE
Status: COMPLETED | OUTPATIENT
Start: 2025-02-19 | End: 2025-02-19

## 2025-02-19 RX ORDER — PIPERACILLIN SODIUM, TAZOBACTAM SODIUM 3; .375 G/15ML; G/15ML
3.38 INJECTION, POWDER, LYOPHILIZED, FOR SOLUTION INTRAVENOUS EVERY 8 HOURS
Status: DISCONTINUED | OUTPATIENT
Start: 2025-02-20 | End: 2025-02-20

## 2025-02-19 RX ORDER — SODIUM CHLORIDE, SODIUM LACTATE, POTASSIUM CHLORIDE, CALCIUM CHLORIDE 600; 310; 30; 20 MG/100ML; MG/100ML; MG/100ML; MG/100ML
1000 INJECTION, SOLUTION INTRAVENOUS CONTINUOUS
Status: DISCONTINUED | OUTPATIENT
Start: 2025-02-19 | End: 2025-02-20

## 2025-02-19 RX ORDER — AMOXICILLIN 250 MG
1 CAPSULE ORAL 2 TIMES DAILY PRN
Status: DISCONTINUED | OUTPATIENT
Start: 2025-02-19 | End: 2025-02-25 | Stop reason: HOSPADM

## 2025-02-19 RX ORDER — NALOXONE HYDROCHLORIDE 0.4 MG/ML
0.4 INJECTION, SOLUTION INTRAMUSCULAR; INTRAVENOUS; SUBCUTANEOUS
Status: DISCONTINUED | OUTPATIENT
Start: 2025-02-19 | End: 2025-02-25 | Stop reason: HOSPADM

## 2025-02-19 RX ORDER — MAGNESIUM HYDROXIDE/ALUMINUM HYDROXICE/SIMETHICONE 120; 1200; 1200 MG/30ML; MG/30ML; MG/30ML
30 SUSPENSION ORAL ONCE
Status: COMPLETED | OUTPATIENT
Start: 2025-02-19 | End: 2025-02-19

## 2025-02-19 RX ORDER — SODIUM CHLORIDE 9 MG/ML
INJECTION, SOLUTION INTRAVENOUS ONCE
Status: COMPLETED | OUTPATIENT
Start: 2025-02-19 | End: 2025-02-19

## 2025-02-19 RX ORDER — NALOXONE HYDROCHLORIDE 0.4 MG/ML
0.2 INJECTION, SOLUTION INTRAMUSCULAR; INTRAVENOUS; SUBCUTANEOUS
Status: DISCONTINUED | OUTPATIENT
Start: 2025-02-19 | End: 2025-02-25 | Stop reason: HOSPADM

## 2025-02-19 RX ORDER — IBUPROFEN 200 MG
800 TABLET ORAL EVERY 8 HOURS PRN
COMMUNITY

## 2025-02-19 RX ORDER — HYDROMORPHONE HYDROCHLORIDE 2 MG/1
2 TABLET ORAL EVERY 4 HOURS PRN
Status: DISCONTINUED | OUTPATIENT
Start: 2025-02-19 | End: 2025-02-20

## 2025-02-19 RX ORDER — IOPAMIDOL 755 MG/ML
90 INJECTION, SOLUTION INTRAVASCULAR ONCE
Status: COMPLETED | OUTPATIENT
Start: 2025-02-19 | End: 2025-02-19

## 2025-02-19 RX ORDER — HYDROMORPHONE HYDROCHLORIDE 1 MG/ML
0.5 INJECTION, SOLUTION INTRAMUSCULAR; INTRAVENOUS; SUBCUTANEOUS ONCE
Status: COMPLETED | OUTPATIENT
Start: 2025-02-19 | End: 2025-02-19

## 2025-02-19 RX ORDER — SUCRALFATE 1 G/1
1 TABLET ORAL 2 TIMES DAILY
COMMUNITY
Start: 2025-02-13

## 2025-02-19 RX ORDER — ONDANSETRON 4 MG/1
4 TABLET, ORALLY DISINTEGRATING ORAL EVERY 6 HOURS PRN
Status: DISCONTINUED | OUTPATIENT
Start: 2025-02-19 | End: 2025-02-25 | Stop reason: HOSPADM

## 2025-02-19 RX ORDER — HYDROMORPHONE HCL IN WATER/PF 6 MG/30 ML
0.2 PATIENT CONTROLLED ANALGESIA SYRINGE INTRAVENOUS
Status: DISCONTINUED | OUTPATIENT
Start: 2025-02-19 | End: 2025-02-20

## 2025-02-19 RX ORDER — HYDROMORPHONE HCL IN WATER/PF 6 MG/30 ML
0.4 PATIENT CONTROLLED ANALGESIA SYRINGE INTRAVENOUS
Status: DISCONTINUED | OUTPATIENT
Start: 2025-02-19 | End: 2025-02-20

## 2025-02-19 RX ORDER — ONDANSETRON 2 MG/ML
8 INJECTION INTRAMUSCULAR; INTRAVENOUS ONCE
Status: COMPLETED | OUTPATIENT
Start: 2025-02-19 | End: 2025-02-19

## 2025-02-19 RX ORDER — AMOXICILLIN 250 MG
2 CAPSULE ORAL 2 TIMES DAILY PRN
Status: DISCONTINUED | OUTPATIENT
Start: 2025-02-19 | End: 2025-02-25 | Stop reason: HOSPADM

## 2025-02-19 RX ORDER — ONDANSETRON 2 MG/ML
4 INJECTION INTRAMUSCULAR; INTRAVENOUS EVERY 6 HOURS PRN
Status: DISCONTINUED | OUTPATIENT
Start: 2025-02-19 | End: 2025-02-25 | Stop reason: HOSPADM

## 2025-02-19 RX ORDER — HYDROMORPHONE HYDROCHLORIDE 1 MG/ML
0.5 INJECTION, SOLUTION INTRAMUSCULAR; INTRAVENOUS; SUBCUTANEOUS
Status: DISCONTINUED | OUTPATIENT
Start: 2025-02-19 | End: 2025-02-19

## 2025-02-19 RX ORDER — LIDOCAINE 40 MG/G
CREAM TOPICAL
Status: DISCONTINUED | OUTPATIENT
Start: 2025-02-19 | End: 2025-02-21

## 2025-02-19 RX ADMIN — ALUMINUM HYDROXIDE, MAGNESIUM HYDROXIDE, AND SIMETHICONE 30 ML: 200; 200; 20 SUSPENSION ORAL at 17:35

## 2025-02-19 RX ADMIN — PIPERACILLIN AND TAZOBACTAM 3.38 G: 3; .375 INJECTION, POWDER, FOR SOLUTION INTRAVENOUS at 19:00

## 2025-02-19 RX ADMIN — SODIUM CHLORIDE, SODIUM LACTATE, POTASSIUM CHLORIDE, AND CALCIUM CHLORIDE 1000 ML: .6; .31; .03; .02 INJECTION, SOLUTION INTRAVENOUS at 21:26

## 2025-02-19 RX ADMIN — ONDANSETRON 8 MG: 2 INJECTION, SOLUTION INTRAMUSCULAR; INTRAVENOUS at 17:36

## 2025-02-19 RX ADMIN — SODIUM CHLORIDE: 0.9 INJECTION, SOLUTION INTRAVENOUS at 19:42

## 2025-02-19 RX ADMIN — FAMOTIDINE 20 MG: 10 INJECTION, SOLUTION INTRAVENOUS at 17:36

## 2025-02-19 RX ADMIN — IOPAMIDOL 90 ML: 755 INJECTION, SOLUTION INTRAVENOUS at 20:16

## 2025-02-19 RX ADMIN — PANTOPRAZOLE SODIUM 40 MG: 40 INJECTION, POWDER, FOR SOLUTION INTRAVENOUS at 23:12

## 2025-02-19 RX ADMIN — HYDROMORPHONE HYDROCHLORIDE 0.5 MG: 1 INJECTION, SOLUTION INTRAMUSCULAR; INTRAVENOUS; SUBCUTANEOUS at 21:03

## 2025-02-19 RX ADMIN — MORPHINE SULFATE 4 MG: 4 INJECTION, SOLUTION INTRAMUSCULAR; INTRAVENOUS at 18:23

## 2025-02-19 ASSESSMENT — ACTIVITIES OF DAILY LIVING (ADL)
ADLS_ACUITY_SCORE: 41

## 2025-02-19 ASSESSMENT — COLUMBIA-SUICIDE SEVERITY RATING SCALE - C-SSRS
1. IN THE PAST MONTH, HAVE YOU WISHED YOU WERE DEAD OR WISHED YOU COULD GO TO SLEEP AND NOT WAKE UP?: NO
2. HAVE YOU ACTUALLY HAD ANY THOUGHTS OF KILLING YOURSELF IN THE PAST MONTH?: NO
6. HAVE YOU EVER DONE ANYTHING, STARTED TO DO ANYTHING, OR PREPARED TO DO ANYTHING TO END YOUR LIFE?: NO

## 2025-02-19 NOTE — ED TRIAGE NOTES
Patient reports that he started developed abdominal pain, started vomiting 6 hours ago.  Pt c/o mid abd pain, has had this before- takes Carafate/Prilosec with no relief.

## 2025-02-19 NOTE — LETTER
[facility]  [address] [address]  Phone: [phone]  Fax: [phone]    February 25, 2025        Julio Stoll  [facility] [id number]          To whom it may concern:    RE: Julio Stoll    Patient was admitted to the hospital 2/19/2025 for an acute medical illness. He will discharge from the hospital 2/25/2025 and it is expected that he may return to work without restrictions on Monday 3/3/2025.    Please contact me for questions or concerns.      Sincerely,      Rigo Yuan MD

## 2025-02-19 NOTE — LETTER
Julio Stoll is being  seen and treated in our emergency department on 2/19/2025.  He will be in touch on his return to work date.    If you have any questions or concerns, please don't hesitate to call.      HERIBERTO Esteban

## 2025-02-19 NOTE — ED PROVIDER NOTES
EMERGENCY DEPARTMENT ENCOUNTER      NAME: Julio Stoll  AGE: 24 year old male  YOB: 2000  MRN: [id number]  EVALUATION DATE & TIME: No admission date for patient encounter.    PCP: No Ref-Primary, Physician    ED PROVIDER: Yenni Ugarte MD    Chief Complaint   Patient presents with    Vomiting    Abdominal Pain         FINAL IMPRESSION:  1. Abdominal pain, epigastric    2. Leukocytosis, unspecified type    3. Acute pancreatitis, unspecified complication status, unspecified pancreatitis type    4. Abnormal LFTs    5. Other ascites          ED COURSE & MEDICAL DECISION MAKING:    Pertinent Labs & Imaging studies reviewed. (See chart for details)  24 year old male with history of chronic issues with GERD.  Recent EGD showing gastritis but no ulcer who presents to the Emergency Department for evaluation of severe epigastric abdominal pain like his chronic heartburn flared over the course the last 6 hours with nausea, vomiting.  Not responding to oral antacids.  Patient looks uncomfortable appearing on examination.  Diffuse abdominal pain, but greater in the epigastric region.  Differential includes GERD, peptic ulcer, gastritis, pancreatitis, hepatobiliary pathology and less likely appendicitis    Patient initially seen evaluate by myself in triage area due to boarding crisis.  Given 8 mg Zofran, Maalox and Pepcid.  Nausea improved but still having pain and given 4 mg of morphine.  CBC notable for WBC of 25.5.  Lactate and blood cultures added on.  Lactate normal.  Covered with Zosyn.  CMP with glucose of 164 but A1c normal.  Alkaline phosphatase 178, ,  with a lipase of greater than 3000.  Favor choledocholithiasis and gallstone pancreatitis based on history and exam.  However concern for ascending cholangitis given his leukocytosis we will proceed with CT of the abdomen pelvis.  Blood alcohol level added on, normal and patient denies any alcohol abuse.  CT abdomen pelvis showed  interstitial edematous pancreatitis without necrosis.  Moderate ascites.  Mild intra and extrahepatic biliary ductal dilation and no obvious stones.  Will proceed with right upper quadrant ultrasound to rule this out.  Will add on lipid panel to check triglycerides.  GI consulted, admitted to medicine for further management.      ED Course as of 02/19/25 2108 Wed Feb 19, 2025   1711 I met with the patient, obtained history, performed an initial exam, and discussed options and plan for diagnostics and treatment here in the ED.   1747 WBC(!): 25.5   1801 Patient's nausea is better, but pain persists.    1828 ALT(!!): 622   1829 AST(!): 245   1829 Alkaline Phosphatase(!): 178   1829 Glucose(!): 164  Suspect stress no history of diabetes   1831 Lipase still pending. Called lab, at least >3000 may have to dilute.  With elevated lipase and abnormal LFTs suspect choledocholithiasis.  Concern for ascending cholangitis given leukocytosis and will add on lactate cultures, Zosyn.   1839 Lipase(!): >3,000   1901 Hemoglobin A1C: 5.5   1913 Calcium Ionized Whole Blood(!!): 2.8  Unsure what to make of this value.  Serum calcium level is normal at 10.0.  Will recheck with a dedicated ionized calcium   1952 Calcium Ionized Whole Blood: 4.8  Prev value erroneous   2021 CT Abdomen Pelvis w Contrast  CT independently interpreted by myself with inflammatory changes surrounding the pancreas, gallbladder and a significant amount of free fluid within the abdomen   2041 Per SOC no beds in system   2056 I spoke with Dr. Macias, hospitalist, who accepted admission.        Medical Decision Making  Obtained supplemental history:Supplemental history obtained?: Family Member/Significant Other  Reviewed external records: External records reviewed?: Outpatient Record: 08/ 21/24  Care impacted by chronic illness:Documented in Chart  Did you consider but not order tests?: Work up considered but not performed and documented in chart, if  applicable  Did you interpret images independently?: Independent interpretation of ECG and images noted in documentation, when applicable.  Consultation discussion with other provider:Did you involve another provider (consultant, , pharmacy, etc.)?: I discussed the care with another health care provider, see documentation for details.  Admit.    MIPS: Not Applicable      At the conclusion of the encounter I discussed the results of all of the tests and the disposition. The questions were answered. The patient or family acknowledged understanding and was agreeable with the care plan.    MEDICATIONS GIVEN IN THE EMERGENCY:  Medications   HYDROmorphone (PF) (DILAUDID) injection 0.5 mg (has no administration in time range)   famotidine (PEPCID) injection 20 mg (20 mg Intravenous $Given 2/19/25 1736)   alum & mag hydroxide-simethicone (MAALOX) suspension 30 mL (30 mLs Oral $Given 2/19/25 1735)   ondansetron (ZOFRAN) injection 8 mg (8 mg Intravenous $Given 2/19/25 1736)   morphine (PF) injection 4 mg (4 mg Intravenous $Given 2/19/25 1823)   sodium chloride 0.9 % infusion ( Intravenous Rate/Dose Change 2/19/25 2030)   piperacillin-tazobactam (ZOSYN) 3.375 g vial to attach to  mL bag (0 g Intravenous Stopped 2/19/25 1938)   iopamidol (ISOVUE-370) solution 90 mL (90 mLs Intravenous $Given 2/19/25 2016)   HYDROmorphone (PF) (DILAUDID) injection 0.5 mg (0.5 mg Intravenous $Given 2/19/25 2103)       NEW PRESCRIPTIONS STARTED AT TODAY'S ER VISIT  New Prescriptions    No medications on file          =================================================================    HPI    Patient information was obtained from: Patient and family members    Use of Intrepreter: N/A       Julio Stoll is a 24 year old male with pertinent medical history of GERD and esophagitis who presents for evaluation of abdominal pain and vomiting.    Patient reports having abdominal pain and vomiting that is similar to his previous flare-up of GERD  and esophagitis. He denies a previous history of abdominal surgeries, but notes he had a GI endoscopy done through [facility] on 01/31/2025 which showed redness but no ulcers. He currently takes omeprazole 20 mg daily and Carafate.     Patient denies history of gallbladder or pancreas issues, or any other complaints at this time.       Chart Review:  08/21/2024: Patient was seen at [facility] for evaluation of GERD with esophagitis without hemorrhage. H. Pylori testing was discussed, but this would require him to be off of his antacid medications which he did not want to do. EGD could also be considered if symptoms persist or worsen. Patient was started on omeprazole 20 mg once per day.    PAST MEDICAL HISTORY:  History reviewed. No pertinent past medical history.    PAST SURGICAL HISTORY:  History reviewed. No pertinent surgical history.    CURRENT MEDICATIONS:    Prior to Admission Medications   Prescriptions Last Dose Informant Patient Reported? Taking?   ibuprofen (ADVIL/MOTRIN) 200 MG tablet   Yes Yes   Sig: Take 800 mg by mouth every 8 hours as needed for pain.   omeprazole (PRILOSEC) 20 MG DR capsule 2/19/2025 at  7:30 AM  No Yes   Sig: Take 1 capsule (20 mg) by mouth daily.   sucralfate (CARAFATE) 1 GM tablet 2/19/2025 Morning  Yes Yes   Sig: Take 1 g by mouth 2 times daily.      Facility-Administered Medications: None       ALLERGIES:  No Known Allergies    FAMILY HISTORY:  History reviewed. No pertinent family history.    SOCIAL HISTORY:  Social History     Tobacco Use    Smoking status: Never    Smokeless tobacco: Former   Vaping Use    Vaping status: Former        VITALS:  Patient Vitals for the past 24 hrs:   BP Temp Temp src Pulse Resp SpO2 Height Weight   02/19/25 1950 130/89 98.2  F (36.8  C) Oral 66 19 99 % -- --   02/19/25 1823 135/72 -- -- 53 -- 100 % -- --   02/19/25 1725 119/66 -- -- 58 18 100 % -- --   02/19/25 1652 123/67 99.4  F (37.4  C) Temporal 70 18 100 % 1.727 m  "(5' 8\") 76.1 kg (167 lb 11.2 oz)       PHYSICAL EXAM    General Appearance: Uncomfortable-appearing, well-nourished, no acute distress  Head:  Normocephalic  Eyes:  conjunctiva/corneas clear  ENT:  membranes are moist without pallor  Neck:  Supple  Cardio:  Regular rate and rhythm  Pulm:  No respiratory distress  Back:  No midline tenderness to palpation, no paraspinal tenderness  Abdomen:  Soft, diffuse abdominal pain, greater in epigastric region, non distended,no rebound or guarding.  Extremities: Normal gait  Skin:  Skin warm, dry, no rashes  Neuro:  Alert and oriented ×3, moving all extremities, no gross sensory defects     RADIOLOGY/LABS:  Reviewed all pertinent imaging. Please see official radiology report. All pertinent labs reviewed and interpreted.    Results for orders placed or performed during the hospital encounter of 02/19/25   CT Abdomen Pelvis w Contrast    Impression    IMPRESSION:   1.  Acute (interstitial edematous) pancreatitis without evidence for necrosis.  2.  Moderate ascites.  3.  Mild intrahepatic and extrahepatic biliary dilation. No calcified gallstones are ductal stones. Consider follow-up right upper quadrant ultrasound for further evaluation.    Comprehensive metabolic panel   Result Value Ref Range    Sodium 140 135 - 145 mmol/L    Potassium 4.0 3.4 - 5.3 mmol/L    Carbon Dioxide (CO2) 27 22 - 29 mmol/L    Anion Gap 12 7 - 15 mmol/L    Urea Nitrogen 12.8 6.0 - 20.0 mg/dL    Creatinine 0.72 0.67 - 1.17 mg/dL    GFR Estimate >90 >60 mL/min/1.73m2    Calcium 10.0 8.8 - 10.4 mg/dL    Chloride 101 98 - 107 mmol/L    Glucose 164 (H) 70 - 99 mg/dL    Alkaline Phosphatase 178 (H) 40 - 150 U/L     (H) 0 - 45 U/L     (HH) 0 - 70 U/L    Protein Total 8.2 6.4 - 8.3 g/dL    Albumin 4.8 3.5 - 5.2 g/dL    Bilirubin Total 1.1 <=1.2 mg/dL   Result Value Ref Range    Lipase >3,000 (H) 13 - 60 U/L   CBC with platelets and differential   Result Value Ref Range    WBC Count 25.5 (H) 4.0 - " 11.0 10e3/uL    RBC Count 6.00 (H) 4.40 - 5.90 10e6/uL    Hemoglobin 17.1 13.3 - 17.7 g/dL    Hematocrit 51.1 40.0 - 53.0 %    MCV 85 78 - 100 fL    MCH 28.5 26.5 - 33.0 pg    MCHC 33.5 31.5 - 36.5 g/dL    RDW 13.6 10.0 - 15.0 %    Platelet Count 405 150 - 450 10e3/uL    % Neutrophils 91 %    % Lymphocytes 4 %    % Monocytes 4 %    % Eosinophils 0 %    % Basophils 0 %    % Immature Granulocytes 1 %    NRBCs per 100 WBC 0 <1 /100    Absolute Neutrophils 23.2 (H) 1.6 - 8.3 10e3/uL    Absolute Lymphocytes 1.0 0.8 - 5.3 10e3/uL    Absolute Monocytes 1.1 0.0 - 1.3 10e3/uL    Absolute Eosinophils 0.0 0.0 - 0.7 10e3/uL    Absolute Basophils 0.1 0.0 - 0.2 10e3/uL    Absolute Immature Granulocytes 0.2 <=0.4 10e3/uL    Absolute NRBCs 0.0 10e3/uL   Hemoglobin A1c   Result Value Ref Range    Estimated Average Glucose 111 <117 mg/dL    Hemoglobin A1C 5.5 <5.7 %   Lactic Acid Whole Blood with 1X Repeat in 2 HR when >2   Result Value Ref Range    Lactic Acid, Initial 0.9 0.7 - 2.0 mmol/L    Calcium Ionized Whole Blood 2.8 (LL) 4.4 - 5.2 mg/dL   Ionized Calcium   Result Value Ref Range    Calcium Ionized Whole Blood 4.8 4.4 - 5.2 mg/dL   Alcohol level blood   Result Value Ref Range    Alcohol ethyl <0.01 <=0.01 g/dL           The creation of this record is based on the scribe s observations of the work being performed by Yenni Ugarte MD and the provider s statements to them. It was created on her behalf by Bc Jones, a trained medical scribe. This document has been checked and approved by the attending provider.    Yenni Ugarte MD  Emergency Medicine  [facility] EMERGENCY DEPARTMENT  [address] [place] [id number] [phone]  Dept: [phone]     Yenni Ugarte MD  02/19/25 6292

## 2025-02-20 ENCOUNTER — ANESTHESIA (OUTPATIENT)
Dept: SURGERY | Facility: HOSPITAL | Age: 25
End: 2025-02-20
Payer: COMMERCIAL

## 2025-02-20 ENCOUNTER — ANESTHESIA EVENT (OUTPATIENT)
Dept: SURGERY | Facility: HOSPITAL | Age: 25
End: 2025-02-20
Payer: COMMERCIAL

## 2025-02-20 VITALS
SYSTOLIC BLOOD PRESSURE: 121 MMHG | OXYGEN SATURATION: 96 % | RESPIRATION RATE: 16 BRPM | HEART RATE: 87 BPM | TEMPERATURE: 100.6 F | BODY MASS INDEX: 25.42 KG/M2 | HEIGHT: 68 IN | WEIGHT: 167.7 LBS | DIASTOLIC BLOOD PRESSURE: 56 MMHG

## 2025-02-20 LAB
ALBUMIN SERPL BCG-MCNC: 3.8 G/DL (ref 3.5–5.2)
ALP SERPL-CCNC: 125 U/L (ref 40–150)
ALT SERPL W P-5'-P-CCNC: 398 U/L (ref 0–70)
ANION GAP SERPL CALCULATED.3IONS-SCNC: 12 MMOL/L (ref 7–15)
AST SERPL W P-5'-P-CCNC: 84 U/L (ref 0–45)
BACTERIA BLD CULT: ABNORMAL
BACTERIA BLD CULT: ABNORMAL
BACTERIA BLD CULT: NORMAL
BILIRUB SERPL-MCNC: 0.8 MG/DL
BUN SERPL-MCNC: 10.9 MG/DL (ref 6–20)
CALCIUM SERPL-MCNC: 9 MG/DL (ref 8.8–10.4)
CALCIUM SERPL-MCNC: 9 MG/DL (ref 8.8–10.4)
CHLORIDE SERPL-SCNC: 105 MMOL/L (ref 98–107)
CK SERPL-CCNC: 50 U/L (ref 39–308)
CREAT SERPL-MCNC: 0.71 MG/DL (ref 0.67–1.17)
EGFRCR SERPLBLD CKD-EPI 2021: >90 ML/MIN/1.73M2
ENTEROCOCCUS FAECALIS: NOT DETECTED
ENTEROCOCCUS FAECIUM: NOT DETECTED
ERYTHROCYTE [DISTWIDTH] IN BLOOD BY AUTOMATED COUNT: 14.1 % (ref 10–15)
GLUCOSE BLDC GLUCOMTR-MCNC: 112 MG/DL (ref 70–99)
GLUCOSE SERPL-MCNC: 162 MG/DL (ref 70–99)
HCO3 SERPL-SCNC: 24 MMOL/L (ref 22–29)
HCT VFR BLD AUTO: 47.9 % (ref 40–53)
HGB BLD-MCNC: 16.1 G/DL (ref 13.3–17.7)
INR PPP: 1.02 (ref 0.85–1.15)
LIPASE SERPL-CCNC: 2780 U/L (ref 13–60)
LISTERIA SPECIES (DETECTED/NOT DETECTED): NOT DETECTED
MAGNESIUM SERPL-MCNC: 2.2 MG/DL (ref 1.7–2.3)
MCH RBC QN AUTO: 28.3 PG (ref 26.5–33)
MCHC RBC AUTO-ENTMCNC: 33.6 G/DL (ref 31.5–36.5)
MCV RBC AUTO: 84 FL (ref 78–100)
PHOSPHATE SERPL-MCNC: 5.3 MG/DL (ref 2.5–4.5)
PLATELET # BLD AUTO: 396 10E3/UL (ref 150–450)
POTASSIUM SERPL-SCNC: 3.9 MMOL/L (ref 3.4–5.3)
PROT SERPL-MCNC: 6.6 G/DL (ref 6.4–8.3)
RBC # BLD AUTO: 5.69 10E6/UL (ref 4.4–5.9)
SODIUM SERPL-SCNC: 141 MMOL/L (ref 135–145)
STAPHYLOCOCCUS AUREUS: NOT DETECTED
STAPHYLOCOCCUS EPIDERMIDIS: NOT DETECTED
STAPHYLOCOCCUS LUGDUNENSIS: NOT DETECTED
STAPHYLOCOCCUS SPECIES: NOT DETECTED
STREPTOCOCCUS AGALACTIAE: NOT DETECTED
STREPTOCOCCUS ANGINOSUS GROUP: NOT DETECTED
STREPTOCOCCUS PNEUMONIAE: NOT DETECTED
STREPTOCOCCUS PYOGENES: NOT DETECTED
STREPTOCOCCUS SPECIES: NOT DETECTED
UPPER EUS: NORMAL
WBC # BLD AUTO: 25.5 10E3/UL (ref 4–11)

## 2025-02-20 PROCEDURE — 360N000076 HC SURGERY LEVEL 3, PER MIN: Performed by: INTERNAL MEDICINE

## 2025-02-20 PROCEDURE — 370N000017 HC ANESTHESIA TECHNICAL FEE, PER MIN: Performed by: INTERNAL MEDICINE

## 2025-02-20 PROCEDURE — 999N000141 HC STATISTIC PRE-PROCEDURE NURSING ASSESSMENT: Performed by: INTERNAL MEDICINE

## 2025-02-20 PROCEDURE — 82310 ASSAY OF CALCIUM: CPT | Performed by: INTERNAL MEDICINE

## 2025-02-20 PROCEDURE — 85014 HEMATOCRIT: CPT | Performed by: INTERNAL MEDICINE

## 2025-02-20 PROCEDURE — 87040 BLOOD CULTURE FOR BACTERIA: CPT | Performed by: INTERNAL MEDICINE

## 2025-02-20 PROCEDURE — 250N000011 HC RX IP 250 OP 636: Performed by: INTERNAL MEDICINE

## 2025-02-20 PROCEDURE — 36415 COLL VENOUS BLD VENIPUNCTURE: CPT | Performed by: INTERNAL MEDICINE

## 2025-02-20 PROCEDURE — 258N000003 HC RX IP 258 OP 636: Performed by: EMERGENCY MEDICINE

## 2025-02-20 PROCEDURE — 82550 ASSAY OF CK (CPK): CPT | Performed by: INTERNAL MEDICINE

## 2025-02-20 PROCEDURE — 250N000009 HC RX 250: Performed by: INTERNAL MEDICINE

## 2025-02-20 PROCEDURE — 272N000001 HC OR GENERAL SUPPLY STERILE: Performed by: INTERNAL MEDICINE

## 2025-02-20 PROCEDURE — 84100 ASSAY OF PHOSPHORUS: CPT | Performed by: INTERNAL MEDICINE

## 2025-02-20 PROCEDURE — 250N000009 HC RX 250: Performed by: NURSE ANESTHETIST, CERTIFIED REGISTERED

## 2025-02-20 PROCEDURE — 120N000001 HC R&B MED SURG/OB

## 2025-02-20 PROCEDURE — 82040 ASSAY OF SERUM ALBUMIN: CPT | Performed by: INTERNAL MEDICINE

## 2025-02-20 PROCEDURE — 83735 ASSAY OF MAGNESIUM: CPT | Performed by: INTERNAL MEDICINE

## 2025-02-20 PROCEDURE — 85610 PROTHROMBIN TIME: CPT | Performed by: INTERNAL MEDICINE

## 2025-02-20 PROCEDURE — 83690 ASSAY OF LIPASE: CPT | Performed by: SPECIALIST

## 2025-02-20 PROCEDURE — 258N000003 HC RX IP 258 OP 636: Performed by: INTERNAL MEDICINE

## 2025-02-20 PROCEDURE — 250N000011 HC RX IP 250 OP 636: Performed by: NURSE ANESTHETIST, CERTIFIED REGISTERED

## 2025-02-20 PROCEDURE — 250N000025 HC SEVOFLURANE, PER MIN: Performed by: INTERNAL MEDICINE

## 2025-02-20 PROCEDURE — 258N000003 HC RX IP 258 OP 636: Performed by: NURSE ANESTHETIST, CERTIFIED REGISTERED

## 2025-02-20 PROCEDURE — 0DJ08ZZ INSPECTION OF UPPER INTESTINAL TRACT, VIA NATURAL OR ARTIFICIAL OPENING ENDOSCOPIC: ICD-10-PCS | Performed by: INTERNAL MEDICINE

## 2025-02-20 PROCEDURE — 99222 1ST HOSP IP/OBS MODERATE 55: CPT | Performed by: SPECIALIST

## 2025-02-20 PROCEDURE — 710N000009 HC RECOVERY PHASE 1, LEVEL 1, PER MIN: Performed by: INTERNAL MEDICINE

## 2025-02-20 PROCEDURE — 99233 SBSQ HOSP IP/OBS HIGH 50: CPT | Performed by: INTERNAL MEDICINE

## 2025-02-20 RX ORDER — NALOXONE HYDROCHLORIDE 0.4 MG/ML
0.1 INJECTION, SOLUTION INTRAMUSCULAR; INTRAVENOUS; SUBCUTANEOUS
Status: DISCONTINUED | OUTPATIENT
Start: 2025-02-20 | End: 2025-02-20 | Stop reason: HOSPADM

## 2025-02-20 RX ORDER — FENTANYL CITRATE 50 UG/ML
INJECTION, SOLUTION INTRAMUSCULAR; INTRAVENOUS PRN
Status: DISCONTINUED | OUTPATIENT
Start: 2025-02-20 | End: 2025-02-20

## 2025-02-20 RX ORDER — FOLIC ACID 5 MG/ML
1 INJECTION, SOLUTION INTRAMUSCULAR; INTRAVENOUS; SUBCUTANEOUS DAILY
Status: DISCONTINUED | OUTPATIENT
Start: 2025-02-20 | End: 2025-02-25

## 2025-02-20 RX ORDER — LIDOCAINE HYDROCHLORIDE 10 MG/ML
INJECTION, SOLUTION INFILTRATION; PERINEURAL PRN
Status: DISCONTINUED | OUTPATIENT
Start: 2025-02-20 | End: 2025-02-20

## 2025-02-20 RX ORDER — FENTANYL CITRATE 50 UG/ML
25 INJECTION, SOLUTION INTRAMUSCULAR; INTRAVENOUS EVERY 5 MIN PRN
Status: DISCONTINUED | OUTPATIENT
Start: 2025-02-20 | End: 2025-02-20 | Stop reason: HOSPADM

## 2025-02-20 RX ORDER — VANCOMYCIN HYDROCHLORIDE 1 G/200ML
1000 INJECTION, SOLUTION INTRAVENOUS EVERY 8 HOURS
Status: DISCONTINUED | OUTPATIENT
Start: 2025-02-20 | End: 2025-02-21

## 2025-02-20 RX ORDER — LIDOCAINE 40 MG/G
CREAM TOPICAL
Status: DISCONTINUED | OUTPATIENT
Start: 2025-02-20 | End: 2025-02-21

## 2025-02-20 RX ORDER — ONDANSETRON 4 MG/1
4 TABLET, ORALLY DISINTEGRATING ORAL EVERY 30 MIN PRN
Status: DISCONTINUED | OUTPATIENT
Start: 2025-02-20 | End: 2025-02-20 | Stop reason: HOSPADM

## 2025-02-20 RX ORDER — FENTANYL CITRATE 50 UG/ML
50 INJECTION, SOLUTION INTRAMUSCULAR; INTRAVENOUS EVERY 5 MIN PRN
Status: DISCONTINUED | OUTPATIENT
Start: 2025-02-20 | End: 2025-02-20 | Stop reason: HOSPADM

## 2025-02-20 RX ORDER — SODIUM CHLORIDE, SODIUM LACTATE, POTASSIUM CHLORIDE, CALCIUM CHLORIDE 600; 310; 30; 20 MG/100ML; MG/100ML; MG/100ML; MG/100ML
INJECTION, SOLUTION INTRAVENOUS CONTINUOUS PRN
Status: DISCONTINUED | OUTPATIENT
Start: 2025-02-20 | End: 2025-02-20

## 2025-02-20 RX ORDER — ONDANSETRON 2 MG/ML
INJECTION INTRAMUSCULAR; INTRAVENOUS PRN
Status: DISCONTINUED | OUTPATIENT
Start: 2025-02-20 | End: 2025-02-20

## 2025-02-20 RX ORDER — SODIUM CHLORIDE, SODIUM LACTATE, POTASSIUM CHLORIDE, CALCIUM CHLORIDE 600; 310; 30; 20 MG/100ML; MG/100ML; MG/100ML; MG/100ML
1000 INJECTION, SOLUTION INTRAVENOUS CONTINUOUS
Status: DISCONTINUED | OUTPATIENT
Start: 2025-02-20 | End: 2025-02-20

## 2025-02-20 RX ORDER — DEXAMETHASONE SODIUM PHOSPHATE 4 MG/ML
4 INJECTION, SOLUTION INTRA-ARTICULAR; INTRALESIONAL; INTRAMUSCULAR; INTRAVENOUS; SOFT TISSUE
Status: DISCONTINUED | OUTPATIENT
Start: 2025-02-20 | End: 2025-02-20 | Stop reason: HOSPADM

## 2025-02-20 RX ORDER — DEXAMETHASONE SODIUM PHOSPHATE 4 MG/ML
INJECTION, SOLUTION INTRA-ARTICULAR; INTRALESIONAL; INTRAMUSCULAR; INTRAVENOUS; SOFT TISSUE PRN
Status: DISCONTINUED | OUTPATIENT
Start: 2025-02-20 | End: 2025-02-20

## 2025-02-20 RX ORDER — OXYCODONE HCL 20 MG/ML
10 CONCENTRATE, ORAL ORAL EVERY 4 HOURS PRN
Status: DISCONTINUED | OUTPATIENT
Start: 2025-02-20 | End: 2025-02-21

## 2025-02-20 RX ORDER — OXYCODONE HYDROCHLORIDE 5 MG/1
10 TABLET ORAL
Status: DISCONTINUED | OUTPATIENT
Start: 2025-02-20 | End: 2025-02-20 | Stop reason: HOSPADM

## 2025-02-20 RX ORDER — HYDROMORPHONE HYDROCHLORIDE 1 MG/ML
0.5 INJECTION, SOLUTION INTRAMUSCULAR; INTRAVENOUS; SUBCUTANEOUS ONCE
Status: COMPLETED | OUTPATIENT
Start: 2025-02-20 | End: 2025-02-20

## 2025-02-20 RX ORDER — LIDOCAINE 40 MG/G
CREAM TOPICAL
Status: DISCONTINUED | OUTPATIENT
Start: 2025-02-20 | End: 2025-02-25 | Stop reason: HOSPADM

## 2025-02-20 RX ORDER — SODIUM CHLORIDE, SODIUM LACTATE, POTASSIUM CHLORIDE, CALCIUM CHLORIDE 600; 310; 30; 20 MG/100ML; MG/100ML; MG/100ML; MG/100ML
INJECTION, SOLUTION INTRAVENOUS CONTINUOUS
Status: DISCONTINUED | OUTPATIENT
Start: 2025-02-20 | End: 2025-02-20 | Stop reason: HOSPADM

## 2025-02-20 RX ORDER — THIAMINE HYDROCHLORIDE 100 MG/ML
100 INJECTION, SOLUTION INTRAMUSCULAR; INTRAVENOUS DAILY
Status: DISCONTINUED | OUTPATIENT
Start: 2025-02-20 | End: 2025-02-25

## 2025-02-20 RX ORDER — OXYCODONE HYDROCHLORIDE 5 MG/1
5 TABLET ORAL
Status: DISCONTINUED | OUTPATIENT
Start: 2025-02-20 | End: 2025-02-20 | Stop reason: HOSPADM

## 2025-02-20 RX ORDER — SODIUM CHLORIDE 9 MG/ML
INJECTION, SOLUTION INTRAVENOUS CONTINUOUS
Status: DISCONTINUED | OUTPATIENT
Start: 2025-02-20 | End: 2025-02-22

## 2025-02-20 RX ORDER — MEROPENEM 1 G/1
1 INJECTION, POWDER, FOR SOLUTION INTRAVENOUS EVERY 8 HOURS
Status: DISCONTINUED | OUTPATIENT
Start: 2025-02-20 | End: 2025-02-21

## 2025-02-20 RX ORDER — ONDANSETRON 2 MG/ML
4 INJECTION INTRAMUSCULAR; INTRAVENOUS EVERY 30 MIN PRN
Status: DISCONTINUED | OUTPATIENT
Start: 2025-02-20 | End: 2025-02-20 | Stop reason: HOSPADM

## 2025-02-20 RX ORDER — OXYCODONE HCL 20 MG/ML
5 CONCENTRATE, ORAL ORAL EVERY 4 HOURS PRN
Status: DISCONTINUED | OUTPATIENT
Start: 2025-02-20 | End: 2025-02-21

## 2025-02-20 RX ORDER — SODIUM CHLORIDE, SODIUM LACTATE, POTASSIUM CHLORIDE, CALCIUM CHLORIDE 600; 310; 30; 20 MG/100ML; MG/100ML; MG/100ML; MG/100ML
INJECTION, SOLUTION INTRAVENOUS CONTINUOUS
Status: DISCONTINUED | OUTPATIENT
Start: 2025-02-20 | End: 2025-02-20

## 2025-02-20 RX ORDER — HYDROMORPHONE HCL IN WATER/PF 6 MG/30 ML
0.4 PATIENT CONTROLLED ANALGESIA SYRINGE INTRAVENOUS EVERY 5 MIN PRN
Status: DISCONTINUED | OUTPATIENT
Start: 2025-02-20 | End: 2025-02-20 | Stop reason: HOSPADM

## 2025-02-20 RX ORDER — FLUMAZENIL 0.1 MG/ML
0.2 INJECTION, SOLUTION INTRAVENOUS
Status: ACTIVE | OUTPATIENT
Start: 2025-02-20 | End: 2025-02-21

## 2025-02-20 RX ORDER — HYDROMORPHONE HCL IN WATER/PF 6 MG/30 ML
0.2 PATIENT CONTROLLED ANALGESIA SYRINGE INTRAVENOUS EVERY 5 MIN PRN
Status: DISCONTINUED | OUTPATIENT
Start: 2025-02-20 | End: 2025-02-20 | Stop reason: HOSPADM

## 2025-02-20 RX ORDER — PROPOFOL 10 MG/ML
INJECTION, EMULSION INTRAVENOUS PRN
Status: DISCONTINUED | OUTPATIENT
Start: 2025-02-20 | End: 2025-02-20

## 2025-02-20 RX ORDER — HYDROMORPHONE HYDROCHLORIDE 1 MG/ML
0.5 INJECTION, SOLUTION INTRAMUSCULAR; INTRAVENOUS; SUBCUTANEOUS
Status: DISCONTINUED | OUTPATIENT
Start: 2025-02-20 | End: 2025-02-25 | Stop reason: HOSPADM

## 2025-02-20 RX ADMIN — LIDOCAINE HYDROCHLORIDE 5 ML: 10 INJECTION, SOLUTION INFILTRATION; PERINEURAL at 14:13

## 2025-02-20 RX ADMIN — SUGAMMADEX 200 MG: 100 INJECTION, SOLUTION INTRAVENOUS at 14:36

## 2025-02-20 RX ADMIN — MEROPENEM 1 G: 1 INJECTION, POWDER, FOR SOLUTION INTRAVENOUS at 19:57

## 2025-02-20 RX ADMIN — DEXAMETHASONE SODIUM PHOSPHATE 10 MG: 4 INJECTION, SOLUTION INTRA-ARTICULAR; INTRALESIONAL; INTRAMUSCULAR; INTRAVENOUS; SOFT TISSUE at 14:18

## 2025-02-20 RX ADMIN — SODIUM CHLORIDE, SODIUM LACTATE, POTASSIUM CHLORIDE, AND CALCIUM CHLORIDE 1000 ML: .6; .31; .03; .02 INJECTION, SOLUTION INTRAVENOUS at 08:06

## 2025-02-20 RX ADMIN — ROCURONIUM 50 MG: 50 INJECTION, SOLUTION INTRAVENOUS at 14:13

## 2025-02-20 RX ADMIN — SODIUM CHLORIDE, SODIUM LACTATE, POTASSIUM CHLORIDE, AND CALCIUM CHLORIDE 1000 ML: .6; .31; .03; .02 INJECTION, SOLUTION INTRAVENOUS at 00:24

## 2025-02-20 RX ADMIN — SODIUM CHLORIDE 1500 MG: 9 INJECTION, SOLUTION INTRAVENOUS at 10:47

## 2025-02-20 RX ADMIN — PANTOPRAZOLE SODIUM 40 MG: 40 INJECTION, POWDER, FOR SOLUTION INTRAVENOUS at 08:06

## 2025-02-20 RX ADMIN — MEROPENEM 1 G: 1 INJECTION, POWDER, FOR SOLUTION INTRAVENOUS at 10:12

## 2025-02-20 RX ADMIN — HYDROMORPHONE HYDROCHLORIDE 0.4 MG: 0.2 INJECTION, SOLUTION INTRAMUSCULAR; INTRAVENOUS; SUBCUTANEOUS at 04:53

## 2025-02-20 RX ADMIN — HYDROMORPHONE HYDROCHLORIDE 0.5 MG: 1 INJECTION, SOLUTION INTRAMUSCULAR; INTRAVENOUS; SUBCUTANEOUS at 11:43

## 2025-02-20 RX ADMIN — PIPERACILLIN AND TAZOBACTAM 3.38 G: 3; .375 INJECTION, POWDER, FOR SOLUTION INTRAVENOUS at 00:52

## 2025-02-20 RX ADMIN — FOLIC ACID 1 MG: 5 INJECTION, SOLUTION INTRAMUSCULAR; INTRAVENOUS; SUBCUTANEOUS at 09:14

## 2025-02-20 RX ADMIN — ONDANSETRON 4 MG: 2 INJECTION INTRAMUSCULAR; INTRAVENOUS at 14:24

## 2025-02-20 RX ADMIN — HYDROMORPHONE HYDROCHLORIDE 0.4 MG: 0.2 INJECTION, SOLUTION INTRAMUSCULAR; INTRAVENOUS; SUBCUTANEOUS at 10:47

## 2025-02-20 RX ADMIN — SODIUM CHLORIDE, POTASSIUM CHLORIDE, SODIUM LACTATE AND CALCIUM CHLORIDE: 600; 310; 30; 20 INJECTION, SOLUTION INTRAVENOUS at 13:37

## 2025-02-20 RX ADMIN — HYDROMORPHONE HYDROCHLORIDE 0.4 MG: 0.2 INJECTION, SOLUTION INTRAMUSCULAR; INTRAVENOUS; SUBCUTANEOUS at 08:07

## 2025-02-20 RX ADMIN — HYDROMORPHONE HYDROCHLORIDE 1 MG: 1 INJECTION, SOLUTION INTRAMUSCULAR; INTRAVENOUS; SUBCUTANEOUS at 17:40

## 2025-02-20 RX ADMIN — SODIUM CHLORIDE: 0.9 INJECTION, SOLUTION INTRAVENOUS at 19:57

## 2025-02-20 RX ADMIN — FENTANYL CITRATE 100 MCG: 50 INJECTION, SOLUTION INTRAMUSCULAR; INTRAVENOUS at 14:11

## 2025-02-20 RX ADMIN — SODIUM CHLORIDE, POTASSIUM CHLORIDE, SODIUM LACTATE AND CALCIUM CHLORIDE: 600; 310; 30; 20 INJECTION, SOLUTION INTRAVENOUS at 14:25

## 2025-02-20 RX ADMIN — PROPOFOL 200 MG: 10 INJECTION, EMULSION INTRAVENOUS at 14:13

## 2025-02-20 RX ADMIN — PIPERACILLIN AND TAZOBACTAM 3.38 G: 3; .375 INJECTION, POWDER, FOR SOLUTION INTRAVENOUS at 09:07

## 2025-02-20 RX ADMIN — HYDROMORPHONE HYDROCHLORIDE 0.4 MG: 0.2 INJECTION, SOLUTION INTRAMUSCULAR; INTRAVENOUS; SUBCUTANEOUS at 00:48

## 2025-02-20 RX ADMIN — THIAMINE HYDROCHLORIDE 100 MG: 100 INJECTION, SOLUTION INTRAMUSCULAR; INTRAVENOUS at 09:13

## 2025-02-20 RX ADMIN — VANCOMYCIN HYDROCHLORIDE 1000 MG: 1 INJECTION, SOLUTION INTRAVENOUS at 17:45

## 2025-02-20 RX ADMIN — HYDROMORPHONE HYDROCHLORIDE 1 MG: 1 INJECTION, SOLUTION INTRAMUSCULAR; INTRAVENOUS; SUBCUTANEOUS at 23:32

## 2025-02-20 ASSESSMENT — ACTIVITIES OF DAILY LIVING (ADL)
ADLS_ACUITY_SCORE: 30
ADLS_ACUITY_SCORE: 56
ADLS_ACUITY_SCORE: 41
ADLS_ACUITY_SCORE: 56
ADLS_ACUITY_SCORE: 30
ADLS_ACUITY_SCORE: 30
ADLS_ACUITY_SCORE: 42
ADLS_ACUITY_SCORE: 30
ADLS_ACUITY_SCORE: 30
ADLS_ACUITY_SCORE: 42
ADLS_ACUITY_SCORE: 30
ADLS_ACUITY_SCORE: 42
ADLS_ACUITY_SCORE: 56
ADLS_ACUITY_SCORE: 56
ADLS_ACUITY_SCORE: 30
ADLS_ACUITY_SCORE: 56
ADLS_ACUITY_SCORE: 42
ADLS_ACUITY_SCORE: 56

## 2025-02-20 NOTE — PROGRESS NOTES
Patient continued to have 6-8/10 abdominal pain. Grimacing, guarding the abdomen when writer present in room. Given 0.4 mg of dilaudid at 1047. Upon return to reassess pain, patient still guarding, grimacing and indicating that pain is still not controlled around 1115. Notified MD. MD ordered one time dose of 0.5 mg Dilaudid and adjusted pain medication regimen. Continuous pulse oximeter worn at all times. Updated patient about pain medication plan.

## 2025-02-20 NOTE — ANESTHESIA PREPROCEDURE EVALUATION
Anesthesia Pre-Procedure Evaluation    Patient: Julio Stoll   MRN: [id number] : 2000        Procedure : Procedure(s):  ESOPHAGOGASTRODUODENOSCOPY, WITH ENDOSCOPIC ULTRASOUND GUIDANCE  ENDOSCOPIC RETROGRADE CHOLANGIOPANCREATOGRAPHY          History reviewed. No pertinent past medical history.   History reviewed. No pertinent surgical history.   No Known Allergies   Social History     Tobacco Use    Smoking status: Never    Smokeless tobacco: Former   Substance Use Topics    Alcohol use: Not Currently      Wt Readings from Last 1 Encounters:   25 76.1 kg (167 lb 11.2 oz)        Anesthesia Evaluation            ROS/MED HX  ENT/Pulmonary:  - neg pulmonary ROS     Neurologic:  - neg neurologic ROS     Cardiovascular:  - neg cardiovascular ROS     METS/Exercise Tolerance:     Hematologic:  - neg hematologic  ROS     Musculoskeletal:  - neg musculoskeletal ROS     GI/Hepatic:  - neg GI/hepatic ROS     Renal/Genitourinary:  - neg Renal ROS     Endo:  - neg endo ROS     Psychiatric/Substance Use:  - neg psychiatric ROS     Infectious Disease:  - neg infectious disease ROS     Malignancy:  - neg malignancy ROS     Other:            Physical Exam    Airway  airway exam normal           Respiratory Devices and Support         Dental       (+) Modest Abnormalities - crowns, retainers, 1 or 2 missing teeth      Cardiovascular   cardiovascular exam normal          Pulmonary   pulmonary exam normal                OUTSIDE LABS:  CBC:   Lab Results   Component Value Date    WBC 25.5 (H) 2025    WBC 25.5 (H) 2025    HGB 16.1 2025    HGB 17.1 2025    HCT 47.9 2025    HCT 51.1 2025     2025     2025     BMP:   Lab Results   Component Value Date     2025     2025    POTASSIUM 3.9 2025    POTASSIUM 4.0 2025    CHLORIDE 105 2025    CHLORIDE 101 2025    CO2 24 2025    CO2 27 2025    BUN 10.9 2025  "   BUN 12.8 02/19/2025    CR 0.71 02/20/2025    CR 0.72 02/19/2025     (H) 02/20/2025     (H) 02/19/2025     COAGS:   Lab Results   Component Value Date    INR 1.02 02/20/2025     POC: No results found for: \"BGM\", \"HCG\", \"HCGS\"  HEPATIC:   Lab Results   Component Value Date    ALBUMIN 3.8 02/20/2025    PROTTOTAL 6.6 02/20/2025     (H) 02/20/2025    AST 84 (H) 02/20/2025    ALKPHOS 125 02/20/2025    BILITOTAL 0.8 02/20/2025     OTHER:   Lab Results   Component Value Date    LACT 1.6 02/19/2025    A1C 5.5 02/19/2025    SUSANA 9.0 02/20/2025    SUSANA 9.0 02/20/2025    PHOS 5.3 (H) 02/20/2025    MAG 2.2 02/20/2025    LIPASE 2,780 (H) 02/20/2025       Anesthesia Plan    ASA Status:  2    NPO Status:  NPO Appropriate    Anesthesia Type: General.     - Airway: ETT   Induction: Intravenous.   Maintenance: Inhalation.        Consents    Anesthesia Plan(s) and associated risks, benefits, and realistic alternatives discussed. Questions answered and patient/representative(s) expressed understanding.     - Discussed: Risks, Benefits and Alternatives for BOTH SEDATION and the PROCEDURE were discussed     - Discussed with:  Patient            Postoperative Care    Pain management: IV analgesics.   PONV prophylaxis: Ondansetron (or other 5HT-3), Dexamethasone or Solumedrol     Comments:               Robson Sanders MD    I have reviewed the pertinent notes and labs in the chart from the past 30 days and (re)examined the patient.  Any updates or changes from those notes are reflected in this note.    Clinically Significant Risk Factors Present on Admission           # Hypocalcemia: Lowest iCa = 2.8 mg/dL in last 2 days, will monitor and replace as appropriate                   # Overweight: Estimated body mass index is 25.5 kg/m  as calculated from the following:    Height as of this encounter: 1.727 m (5' 8\").    Weight as of this encounter: 76.1 kg (167 lb 11.2 oz).                "

## 2025-02-20 NOTE — CONSULTS
Consultation - Surgery  Julio Stoll,  2000, MRN [id number]    Admitting Dx: Abdominal pain, epigastric [R10.13]  Leukocytosis, unspecified type [D72.829]  Acute pancreatitis, unspecified complication status, unspecified pancreatitis type [K85.90]  Abnormal LFTs [R79.89]  Other ascites [R18.8]    PCP: No Ref-Primary, Physician, None   Code status:  Full Code       Extended Emergency Contact Information  Primary Emergency Contact: Fabio Stoll  Mobile Phone: [phone]  Relation: Father  Secondary Emergency Contact: Emily Rand  Mobile Phone: [phone]  Relation: Mother       Assessment and Plan   Impression:  Gallstone pancreatitis.  His LFTs are trending down but they did not repeat his lipase and he is still having abdominal pain.  I think we need to see what is happening with that.  GI had been formally consulted but for what ever reason we were not actually formally consulted.  In an ideal world, we are both involved so that we can try to coordinate laparoscopic cholecystectomy with potential ERCP.  Explained to him that he does not need his gallbladder out immediately but if we can coordinate it to be done at the same time it saves him another general anesthetic.      Plan:  Have asked them to add a lipase to the labs.  Will discuss with gastroenterology then as to the best timing for this patient.  I am in favor of him having an ERCP as his common bile duct is somewhat dilated and he is having ongoing pain.           Chief Complaint <principal problem not specified>       HPI     This is a 24 year old year old male who had an abrupt onset of abdominal pain yesterday morning.  Became severe enough last night that he presented to the emergency room.  He has never had pain like this before.  He has had no previous abdominal surgeries.  CT scan of the abdomen shows pancreatitis mildly dilated common and intrahepatic bile ducts.  Lipase was greater than 3000.  Although his other LFTs have  dropped a little bit since last night, they did not repeat the lipase this morning.       Medical History  Patient Active Problem List   Diagnosis    History of chicken pox    History of difficulty sleeping    Nocturnal enuresis    Abdominal pain, epigastric    Other ascites    Abnormal LFTs    Leukocytosis, unspecified type    Acute pancreatitis, unspecified complication status, unspecified pancreatitis type       [unfilled] Surgical History  History reviewed. No pertinent surgical history.     Social History  Social History     Tobacco Use    Smoking status: Never    Smokeless tobacco: Former   Vaping Use    Vaping status: Former       Allergies  No Known Allergies Family History  Reviewed, and family history is not on file.  The Family history is not pertinent to the patients chief complaint. Psychosocial Needs  Social History     Social History Narrative    Not on file     Additional psychosocial needs reviewed per nursing assessment.     Prior to Admission Medications   Current Outpatient Medications   Medication Instructions    ibuprofen (ADVIL/MOTRIN) 800 mg, EVERY 8 HOURS PRN    omeprazole (PRILOSEC) 20 mg, Oral, DAILY    sucralfate (CARAFATE) 1 g, 2 TIMES DAILY           Review of Systems:  Pertinent items are noted in HPI. Physical Exam:  Temp:  [98.1  F (36.7  C)-99.4  F (37.4  C)] 99  F (37.2  C)  Pulse:  [] 108  Resp:  [18-20] 20  BP: (119-141)/(66-89) 130/67  SpO2:  [96 %-100 %] 96 %    General appearance: alert, appears stated age, and cooperative, is a little uncomfortable  Eyes: No scleral icterus  Lungs: Breathing comfortably.  No shortness of breath  Heart: He is tachycardic.  Abdomen: Soft, flat, tender across the epigastric region.  Extremities: extremities, peripheral pulses and reflexes normal  Pulses: 2+ and symmetric  Skin: Skin color, texture, turgor normal. No rashes or lesions  Neurologic: Grossly normal       Pertinent Labs  Lab Results: personally reviewed.   Lab Results   Component  Value Date    WBC 25.5 02/20/2025    HGB 16.1 02/20/2025    HCT 47.9 02/20/2025    MCV 84 02/20/2025     02/20/2025   Lipase greater than 3000 last night, pending this morning.  Bilirubin normal    AST 84  Alk phos was 178 down to 125 this morning     Pertinent Radiology  Radiology Results: Personally reviewed image/s and Personally reviewed impression/s  EKG Results: not reviewed.

## 2025-02-20 NOTE — PLAN OF CARE
Pt is alert and oriented on room air. Pt c/o of severe abdominal pain. Patient had CT completed, acute pancreatitis suspected.    Dee Storm RN        Problem: Adult Inpatient Plan of Care  Goal: Plan of Care Review  Description: The Plan of Care Review/Shift note should be completed every shift.  The Outcome Evaluation is a brief statement about your assessment that the patient is improving, declining, or no change.  This information will be displayed automatically on your shift  note.  Outcome: Progressing   Goal Outcome Evaluation:

## 2025-02-20 NOTE — PLAN OF CARE
Problem: Adult Inpatient Plan of Care  Goal: Optimal Comfort and Wellbeing  Outcome: Progressing  Intervention: Monitor Pain and Promote Comfort  Recent Flowsheet Documentation  Taken 2/20/2025 1108 by Aries Guillaume, RN  Pain Management Interventions: medication (see MAR)  Taken 2/20/2025 0819 by Aries Guillaume, RN  Pain Management Interventions: medication (see MAR)  Taken 2/20/2025 0807 by Aries Guillaume, RN  Pain Management Interventions: medication (see MAR)     Problem: Pain Acute  Goal: Optimal Pain Control and Function  Outcome: Progressing  Intervention: Develop Pain Management Plan  Recent Flowsheet Documentation  Taken 2/20/2025 1108 by Aries Guillaume, RN  Pain Management Interventions: medication (see MAR)  Taken 2/20/2025 0819 by Aries Guillaume, RN  Pain Management Interventions: medication (see MAR)  Taken 2/20/2025 0807 by Aries Guillaume, RN  Pain Management Interventions: medication (see MAR)  Intervention: Prevent or Manage Pain  Recent Flowsheet Documentation  Taken 2/20/2025 1108 by Aries Guillaume, RN  Sensory Stimulation Regulation: lighting decreased  Sleep/Rest Enhancement:   awakenings minimized   noise level reduced  Medication Review/Management: medications reviewed  Taken 2/20/2025 0807 by Aries Guillaume, RN  Sensory Stimulation Regulation: lighting decreased  Sleep/Rest Enhancement:   awakenings minimized   noise level reduced  Medication Review/Management: medications reviewed   Goal Outcome Evaluation:  Patient A&Ox4, makes needs known. Vitally tachycardic, but normotensive on room air. Up SBA/Independent. Pain between 1-8. See pain note from earlier. Family at bedside. Left for surgical procedure around 1230. Belongings sent with patient. Report given to CARMINA RN. Patient updated with plan.

## 2025-02-20 NOTE — PROGRESS NOTES
"[facility]    Medicine Progress Note - Hospitalist Service    Date of Admission:  2/19/2025    Assessment & Plan     24 year old male with pertinent medical history of GERD and esophagitis who presented for evaluation of upper abdominal pain, nausea and vomiting.     # Acute pancreatitis  # Acute hepatitis  # Cholelithiasis  # Possible choledocholithiasis  # Ascites  # Hepatic steatosis   # GERD  - CT A/P (2/19/25): Acute (interstitial edematous) pancreatitis without evidence for necrosis, Moderate ascites, Mild intrahepatic and extrahepatic biliary dilation. No calcified gallstones are ductal stones.   - RUQ US (2/19/25): Cholelithiasis and mild gallbladder distention. Mild extra hepatic biliary dilatation. Common duct measures 8 mm.   - Lipase > 3K  -  -> 84  -  -> 398  - TB and ALP normal  - EtOH negative  -   - WBC 25.5 -> 25.5  - IV Vanc/Meropenem  - NPO  - LR 175ml/h  - Pain meds as ordered  - IV PPI  - GI consulted    Sepsis due to above and GPC in chains/pairs bacteremia  - suspect source due to above  - Repeat BC x 2  - IV Vanc/Meropenem pending culture results            Diet: NPO for Medical/Clinical Reasons Except for: Meds    DVT Prophylaxis: Pneumatic Compression Devices  Shanks Catheter: Not present  Lines: None     Cardiac Monitoring: ACTIVE order. Indication: Tachyarrhythmias, acute (48 hours)  Code Status: Full Code      Clinically Significant Risk Factors Present on Admission           # Hypocalcemia: Lowest iCa = 2.8 mg/dL in last 2 days, will monitor and replace as appropriate                   # Overweight: Estimated body mass index is 25.5 kg/m  as calculated from the following:    Height as of this encounter: 1.727 m (5' 8\").    Weight as of this encounter: 76.1 kg (167 lb 11.2 oz).              Social Drivers of Health    Tobacco Use: Medium Risk (2/19/2025)    Patient History     Smoking Tobacco Use: Never     Smokeless Tobacco Use: Former        "   Disposition Plan     Medically Ready for Discharge: Anticipated in 2-4 Days             Dilan Hartley DO, DO  Hospitalist Service  [facility]  Securely message with [employer] (more info)  Text page via [employer] Paging/Directory   ______________________________________________________________________    Interval History     Afebrile. Pain mid abdomen/luq persists but improved with pain meds. No n/v. No melena, hematochezia.    Physical Exam   Vital Signs: Temp: 99  F (37.2  C) Temp src: Oral BP: 130/67 Pulse: 108   Resp: 20 SpO2: 96 % O2 Device: None (Room air)    Weight: 167 lbs 11.2 oz    General appearance - alert, and in no distress  Eyes - pupils equal and reactive, extraocular eye movements intact, sclera anicteric  Mouth - mucous membranes pasty, pharynx normal without lesions  Lungs - clear to auscultation, no wheezes, rales or rhonchi, symmetric air entry  Heart - rrr. No peripheral edema.  Abdomen - soft, nd, ttp mid-epigastric, ruq, luq. BS+  Neurological - alert, oriented, normal speech, no focal findings or movement disorder noted  Skin - no c/c/p    Lab/imaging reviewed

## 2025-02-20 NOTE — CONSULTS
[facility] DIGESTIVE HEALTH CONSULTATION      NAME: Julio Stoll    MEDICAL RECORD #: [id number]    DATE / TIME: 2/20/2025/7:22 AM    PRIMARY CARE PROVIDER: No Ref-Primary, Physician    REQUESTING PROVIDER: Dilan Hartley DO          REASON FOR THE CONSULT:  pancreatitis    HPI:     This is a 24-year-old man with a history of GERD who presented with abdominal pain and vomiting.    Initial labs were notable for a lipase of greater than 3000 and, ALT of 622, AST of 245, total bilirubin of 1.1, alk phos of 178.  LFTs were previously normal on January 24.  Triglycerides are normal at 139.  White blood cells elevated to 25 within normal hemoglobin and platelets.  Blood cultures were sent and are pending.  Blood alcohol level is negative.  UA is negative for infection.  CT scan of his abdomen and pelvis showed acute interstitial edematous pancreatitis without necrosis.  Also with moderate upper abdominal ascites, mild intra and extrahepatic biliary dilation.  Abdominal ultrasound showed mild extrahepatic biliary dilation with the common bile duct of 8 mm as well as cholelithiasis and hepatic steatosis. Repeat LFTs are improving with an ALT of 398, AST of 84, total bilirubin of 0.8, alk phos of 125. He reports he is still having significant abdominal pain but better with pain meds at around 5/10.     He had an EGD that was done January 31, 2025 for abdominal pain that showed mild patchy erythema in his stomach but otherwise was normal.  Gastric biopsy showed nonerosive reactive gastropathy.  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  Follow-up celiac testing was negative.    REVIEW OF SYSTEMS: 13 point review of systems is negative except that noted above.    PAST MEDICAL HISTORY: History reviewed. No pertinent past medical history.    PAST SURGICAL HISTORY: History reviewed. No pertinent surgical history.    FAMILY HISTORY: History reviewed. No pertinent family  history.    SOCIAL HISTORY:   Social History     Tobacco Use    Smoking status: Never    Smokeless tobacco: Former   Substance Use Topics    Alcohol use: Not on file        MEDS:  (Not in a hospital admission)      ALLERGIES/SENSITIVITIES: Patient has no known allergies.    MEDICATIONS:  Current Outpatient Medications   Medication Sig Dispense Refill    ibuprofen (ADVIL/MOTRIN) 200 MG tablet Take 800 mg by mouth every 8 hours as needed for pain.      omeprazole (PRILOSEC) 20 MG DR capsule Take 1 capsule (20 mg) by mouth daily. 14 capsule 0    sucralfate (CARAFATE) 1 GM tablet Take 1 g by mouth 2 times daily.         PHYSICAL EXAM:  Temp:  [98.1  F (36.7  C)-99.4  F (37.4  C)] 98.1  F (36.7  C)  Pulse:  [53-95] 95  Resp:  [18-20] 20  BP: (119-141)/(66-89) 127/67  SpO2:  [98 %-100 %] 99 %  Body mass index is 25.5 kg/m .  GEN: Well developed, well nourished 24 year old in no acute distress.  HEENT: sclera anicteric, moist mucous membranes.   LYMPH: No cervical lymphadenopathy  PULM: Nonlabored breathing. Breath sounds equal.   CARDIO: Regular rate  GI: Mildly distended, diffusely tender to palpation, worse in the epigastric area with no rebound or guarding.    EXT: warm, no lower extremity edema  NEURO: Alert. No focal defects.   PSYCH: Mental status appropriate, mood and affect normal.    SKIN: No rashes  MSK: No joint abnormalities    LABORATORY DATA:  CBC RESULTS:   Recent Labs   Lab Test 02/20/25  0545 02/19/25  1736 06/24/24  2153   WBC 25.5* 25.5* 7.7   RBC 5.69 6.00* 4.81   HGB 16.1 17.1 14.0   HCT 47.9 51.1 41.5   MCV 84 85 86   MCH 28.3 28.5 29.1   MCHC 33.6 33.5 33.7   RDW 14.1 13.6 13.1    405 302        CMP Results:   Recent Labs   Lab Test 02/20/25  0545 02/19/25  1736 01/24/25  1323    140 140   POTASSIUM 3.9 4.0 4.1   CHLORIDE 105 101 103   CO2 24 27 26   ANIONGAP 12 12 11   * 164* 97   BUN 10.9 12.8 13.2   CR 0.71 0.72 0.67   BILITOTAL 0.8 1.1 0.4   ALKPHOS 125 178* 84   *  "622* 39   AST 84* 245* 24        INR Results: No results for input(s): \"INR\" in the last [id number] hours.       IMAGING:    See HPI      IMPRESSION:     This is a 24-year-old man with a history of GERD who presented with acute onset severe abdominal pain and vomiting found to have acute pancreatitis, with etiology concerning for biliary pancreatitis with new elevation in LFTs,  biliary dilation, and gallstones on ultrasound. TG and BAL are normal.  LFTs are downtrending but still with significant abdominal pain, and unclear if pain is due to ongoing biliary obstruction versus the pancreatitis itself (with spontaneous passage of the stone due to downtrending LFTs).     RECOMMENDATIONS:   - npo  -Supportive care for acute pancreatitis with pain control, IV fluids  - Case was discussed with biliary provider Dr. Romano.  At this point in time we would like to start with either an EUS or an MRCP to assess for ongoing choledocholithasis. General surgery (Dr. Jrery) is also following and is planning to do a cholecystectomy.  If surgery wants to try to coordinate a joint procedure today we will plan for EUS +/- ERCP today followed by CCY.  Otherwise if surgery wants to try to schedule for tomorrow we will plan to get MRCP today.    Total time spent today in the management of this patient (which may include  chart review, review of imaging, discussion of case with additional specialists and/or family members, face to face consultation/exam with patient, documentation, and coordination of care): 60 minutes                 Ariana Doyel MD  Thank you for the opportunity to participate in the care of this patient.   Please feel free to call me with any questions or concerns.  Phone number [phone].            CC: [facility], No Ref-Primary, Physician    "

## 2025-02-20 NOTE — ANESTHESIA PROCEDURE NOTES
Airway       Patient location during procedure: OR       Procedure Start/Stop Times: 2/20/2025 2:15 PM  Staff -        CRNA: Warner Epperson APRN CRNA       Performed By: CRNA  Consent for Airway        Urgency: elective  Indications and Patient Condition       Indications for airway management: jesus-procedural       Induction type:intravenous       Mask difficulty assessment: 1 - vent by mask    Final Airway Details       Final airway type: endotracheal airway       Successful airway: ETT - single  Endotracheal Airway Details        ETT size (mm): 7.0       Cuffed: yes       Successful intubation technique: direct laryngoscopy       DL Blade Type: Mathews 2       Grade View of Cords: 1       Adjucts: stylet       Position: Right       Measured from: lips       Secured at (cm): 23       Bite block used: None    Post intubation assessment        Placement verified by: capnometry, equal breath sounds and chest rise        Number of attempts at approach: 1       Number of other approaches attempted: 0       Secured with: tape       Ease of procedure: easy       Dentition: Intact and Unchanged    Medication(s) Administered   Medication Administration Time: 2/20/2025 2:15 PM

## 2025-02-20 NOTE — ANESTHESIA CARE TRANSFER NOTE
Patient: Julio Stoll    Procedure: Procedure(s):  ESOPHAGOGASTRODUODENOSCOPY, WITH ENDOSCOPIC ULTRASOUND GUIDANCE       Diagnosis: Choledocholithiasis [K80.50]  Diagnosis Additional Information: No value filed.    Anesthesia Type:   General     Note:    Oropharynx: oropharynx clear of all foreign objects and spontaneously breathing  Level of Consciousness: drowsy  Oxygen Supplementation: face mask  Level of Supplemental Oxygen (L/min / FiO2): 6  Independent Airway: airway patency satisfactory and stable  Dentition: dentition unchanged  Vital Signs Stable: post-procedure vital signs reviewed and stable  Report to RN Given: handoff report given  Patient transferred to: PACU    Handoff Report: Identifed the Patient, Identified the Reponsible Provider, Reviewed the pertinent medical history, Discussed the surgical course, Reviewed Intra-OP anesthesia mangement and issues during anesthesia, Set expectations for post-procedure period and Allowed opportunity for questions and acknowledgement of understanding      Vitals:  Vitals Value Taken Time   BP     Temp     Pulse     Resp     SpO2         Electronically Signed By: DONNY Sy CRNA  February 20, 2025  2:49 PM

## 2025-02-20 NOTE — H&P
GENERAL PRE-PROCEDURE:   Procedure:  EUS with ERCP if needed - gallstone pancreatitis  Date/Time:  2/20/2025 1:52 PM    Verbal consent obtained?: Yes    Written consent obtained?: Yes    Risks and benefits: Risks, benefits and alternatives were discussed    Consent given by:  Patient  Patient states understanding of procedure being performed: Yes    Patient's understanding of procedure matches consent: Yes    Procedure consent matches procedure scheduled: Yes    Expected level of sedation:  Deep  Appropriately NPO:  Yes  ASA Class:  3  Mallampati  :  Grade 2- soft palate, base of uvula, tonsillar pillars, and portion of posterior pharyngeal wall visible  Lungs:  Lungs clear with good breath sounds bilaterally  Heart:  Sinus tach  History & Physical reviewed:  History and physical reviewed and no updates needed  Statement of review:  I have reviewed the lab findings, diagnostic data, medications, and the plan for sedation

## 2025-02-20 NOTE — ANESTHESIA POSTPROCEDURE EVALUATION
Patient: Julio Stoll    Procedure: Procedure(s):  ESOPHAGOGASTRODUODENOSCOPY, WITH ENDOSCOPIC ULTRASOUND GUIDANCE       Anesthesia Type:  General    Note:  Disposition: Outpatient   Postop Pain Control: Uneventful            Sign Out: Well controlled pain   PONV: No   Neuro/Psych: Uneventful            Sign Out: Acceptable/Baseline neuro status   Airway/Respiratory: Uneventful            Sign Out: Acceptable/Baseline resp. status   CV/Hemodynamics: Uneventful            Sign Out: Acceptable CV status; No obvious hypovolemia; No obvious fluid overload   Other NRE: NONE   DID A NON-ROUTINE EVENT OCCUR? No           Last vitals:  Vitals Value Taken Time   /60 02/20/25 1452   Temp 36.9  C (98.4  F) 02/20/25 1452   Pulse 100 02/20/25 1456   Resp 22 02/20/25 1456   SpO2 100 % 02/20/25 1456   Vitals shown include unfiled device data.    Electronically Signed By: Robson Sanders MD  February 20, 2025  2:57 PM

## 2025-02-20 NOTE — PROGRESS NOTES
Patient admitted to room 1 at approximately 1600 via cart from surgery.  Reason for Admission:   Report received from:   Patient was accompanied by Parents, Brother, and Sister.  Discharge transportation provided by:  Patient ambulated/transferred:  with stand-by assist. self.  Patient is alert and orientated x 3.  Outpatient Observation education provided to: (patient, family, friend)  MDRO Education done if applicable (MRSA, VRE, etc)  Safety risks were identified during admission:  none.   Yellow risk/fall band applied:  No  Home meds sent home: Yes  Home meds sent to pharmacy:No IF YES add 1/2 sheet laminated page reminder to chart/clipboard   Detailed Belongings: clothing, blanket, phone

## 2025-02-20 NOTE — PLAN OF CARE
Temp: 98.1  F (36.7  C) Temp src: Oral BP: 127/67 Pulse: 95   Resp: 20 SpO2: 99 % O2 Device: None (Room air)     Alert and oriented, no nausea, PRN dilaudid given for abdominal pain and was effective.  NPO  Problem: Pain Acute  Goal: Optimal Pain Control and Function  Outcome: Progressing

## 2025-02-20 NOTE — H&P
[facility]    History and Physical - Hospitalist Service       Date of Admission:  2/19/2025    24 year old male with history of chronic issues with GERD.  Recent EGD showing gastritis but no ulcer who presents to the Emergency Department for evaluation of severe epigastric abdominal pain like his chronic heartburn flared over the course the last 6 hours with nausea, vomiting.     Assessment & Plan    Acute pancreatitis  CT showing acute pancreatitis without evidence of necrosis, moderate ascites, mild intra/extrahepatic biliary duct dilation  Denies alcohol use, normal TG levels.    Lipase more than 3000  Possibly from choledocholithiasis  Admit to cardiac monitoring  N.p.o., optimise  control pain  IV hydration aggressively with Ringer's lactate  Consult GI, further workup i.e. MRCP per them    Elevated LFTs  CT with dilated ducts above  Abdo us pending   Await GI evaluation    Leukocytosis  Lactic acidosis  Unclear if stress reaction or early cholangitis  Cover with antibiotics for now  Follow blood cultures sent in ED  Continue IV hydration and trend lactic acid    Nausea and vomiting due to above  Much better with antiemetics   continue as needed Zofran for now    History of GERD  With esophagitis in the past  Change to IV PPI   GI consulted     CT showing moderate ascites  Abdominal sonogram ordered and pending  Await results of abdomen sonogram, paracentesis if needed  per GI    Full code         Diet: NPO for Medical/Clinical Reasons Except for: Meds    DVT Prophylaxis: Low Risk/Ambulatory with no VTE prophylaxis indicated  Shanks Catheter: Not present  Lines: None     Cardiac Monitoring: ACTIVE order. Indication: Tachyarrhythmias, acute (48 hours)  Code Status: Full Code      Clinically Significant Risk Factors Present on Admission           # Hypocalcemia: Lowest iCa = 2.8 mg/dL in last 2 days, will monitor and replace as appropriate                   # Overweight: Estimated body  "mass index is 25.5 kg/m  as calculated from the following:    Height as of this encounter: 1.727 m (5' 8\").    Weight as of this encounter: 76.1 kg (167 lb 11.2 oz).              Disposition Plan     Medically Ready for Discharge: Anticipated in 2-4 Days           Russel Macias MD  Hospitalist Service  [facility]  Securely message with [employer] (more info)  Text page via [facility] Paging/Directory     ______________________________________________________________________    Chief Complaint   Abdo pain today     History is obtained from the patient    History of Present Illness   Patient reports having abdominal pain and vomiting that is similar to his previous flare-up of GERD and esophagitis. He denies a previous history of abdominal surgeries, but notes he had a GI endoscopy done through [facility] on 01/31/2025 which showed redness but no ulcers. He currently takes omeprazole 20 mg daily and Carafat with compliance. Pain is epigastric , radiating, associated with nausea and vomitting, no fever or chills, no sob or chest pain, no diarrhea.     In ed he was in painful distress, however vitals remain stable , ct abdo with pancreatic inflammation, ascites also seen, labs with elevated lipase levels, noted leucocytosis, elevated liver tests, he is admitted for  acute pancreatitis      Past Medical History    History reviewed. No pertinent past medical history.    Past Surgical History   History reviewed. No pertinent surgical history.    Prior to Admission Medications   Prior to Admission Medications   Prescriptions Last Dose Informant Patient Reported? Taking?   ibuprofen (ADVIL/MOTRIN) 200 MG tablet   Yes Yes   Sig: Take 800 mg by mouth every 8 hours as needed for pain.   omeprazole (PRILOSEC) 20 MG DR capsule 2/19/2025 at  7:30 AM  No Yes   Sig: Take 1 capsule (20 mg) by mouth daily.   sucralfate (CARAFATE) 1 GM tablet 2/19/2025 Morning  Yes Yes   Sig: Take 1 g by mouth 2 times daily.    "   Facility-Administered Medications: None          Physical Exam   Vital Signs: Temp: 98.2  F (36.8  C) Temp src: Oral BP: 135/74 Pulse: 68   Resp: 18 SpO2: 98 % O2 Device: None (Room air)    Weight: 167 lbs 11.2 oz    General Appearance: Aaox3, in some distress  Respiratory: clear   Cardiovascular: s1 and 2 only   GI: tender all over, no real guarding, bs heard   Skin: no erythema   Other: No edema   Neuro non focal     Medical Decision Making       75 MINUTES SPENT BY ME on the date of service doing chart review, history, exam, documentation & further activities per the note.      Data   Imaging results reviewed over the past 24 hrs:   Recent Results (from the past 24 hours)   CT Abdomen Pelvis w Contrast    Narrative    EXAM: CT ABDOMEN PELVIS W CONTRAST  LOCATION: [facility]  DATE: 2/19/2025    INDICATION: epigastric abd pain  COMPARISON: None.  TECHNIQUE: CT scan of the abdomen and pelvis was performed following injection of IV contrast. Multiplanar reformats were obtained. Dose reduction techniques were used.  CONTRAST: isovue 370 90ml    FINDINGS:   LOWER CHEST: Normal.    HEPATOBILIARY: Mild intrahepatic biliary dilation. Mild extrahepatic biliary dilation measuring non-10 mm. No calcified gallstones or biliary ductal stones. No suspicious liver lesion.    PANCREAS: Moderate ascites in the upper abdomen centered around the pancreas. Mild pancreatic edema but homogeneous enhancement. No pancreatic mass or calcification. No ductal dilation.    SPLEEN: Normal.    ADRENAL GLANDS: Normal.    KIDNEYS/BLADDER: Normal.    BOWEL: Moderate upper abdominal ascites. Trace free fluid in the pelvis. No free air. Small bowel normal in caliber. No bowel wall thickening or abnormal enhancement. Normal caliber appendix.    LYMPH NODES: Normal.    VASCULATURE: Normal.    PELVIC ORGANS: Normal.    MUSCULOSKELETAL: Normal.      Impression    IMPRESSION:   1.  Acute (interstitial edematous) pancreatitis  without evidence for necrosis.  2.  Moderate ascites.  3.  Mild intrahepatic and extrahepatic biliary dilation. No calcified gallstones are ductal stones. Consider follow-up right upper quadrant ultrasound for further evaluation.    XR Chest 1 View    Narrative    EXAM: XR CHEST 1 VIEW  LOCATION: [facility]  DATE: 2/19/2025    INDICATION: weak  COMPARISON: None.      Impression    IMPRESSION: Negative chest.   US Abdomen Limited    Narrative    EXAM: US ABDOMEN LIMITED  LOCATION: [facility]  DATE: 2/19/2025    INDICATION: pancreatitis  COMPARISON: CT 02/19/2025  TECHNIQUE: Limited abdominal ultrasound.    FINDINGS:    GALLBLADDER: Cholelithiasis. No visible wall thickening. Sonographic Early's sign negative. Mild gallbladder distention.    BILE DUCTS: Mild extra hepatic biliary dilatation. Common duct measures 8 mm.    LIVER: Hepatic steatosis.    RIGHT KIDNEY: No hydronephrosis.    PANCREAS: Partial obscuration by bowel gas. Changes of pancreatitis better demonstrated on CT.      Impression    IMPRESSION:  1.  Cholelithiasis and mild gallbladder distention.  2.  Sonographic Early's sign negative.  3.  Changes of pancreatitis better demonstrated on CT.

## 2025-02-20 NOTE — PHARMACY-ADMISSION MEDICATION HISTORY
Pharmacist Admission Medication History    Admission medication history is complete. The information provided in this note is only as accurate as the sources available at the time of the update.    Information Source(s): Patient, Family member, and CareEverywhere/SureScripts via in-person    Pertinent Information:   Patient stated that he typically takes 4 tablets of ibuprofen when needed which is rare.    Changes made to PTA medication list:  Added: sucralfate, ibuprofen  Deleted: None  Changed: None    Allergies reviewed with patient and updates made in EHR: yes    Medication History Completed By: Rigo Lazcano RPH 2/19/2025 6:46 PM    PTA Med List   Medication Sig Last Dose/Taking    ibuprofen (ADVIL/MOTRIN) 200 MG tablet Take 800 mg by mouth every 8 hours as needed for pain. Taking As Needed    omeprazole (PRILOSEC) 20 MG DR capsule Take 1 capsule (20 mg) by mouth daily. 2/19/2025 at  7:30 AM    sucralfate (CARAFATE) 1 GM tablet Take 1 g by mouth 2 times daily. 2/19/2025 Morning

## 2025-02-20 NOTE — PHARMACY-VANCOMYCIN DOSING SERVICE
Pharmacy Vancomycin Initial Note  Date of Service 2025  Patient's  2000  24 year old, male    Indication: Bacteremia    Current estimated CrCl = Estimated Creatinine Clearance: 174.2 mL/min (based on SCr of 0.71 mg/dL).    Creatinine for last 3 days  2025:  5:36 PM Creatinine 0.72 mg/dL  2025:  5:45 AM Creatinine 0.71 mg/dL    Recent Vancomycin Level(s) for last 3 days  No results found for requested labs within last 3 days.      Vancomycin IV Administrations (past 72 hours)        No vancomycin orders with administrations in past 72 hours.                    Nephrotoxins and other renal medications (From now, onward)      Start     Dose/Rate Route Frequency Ordered Stop    25 1700  vancomycin (VANCOCIN) 1,000 mg in 200 mL dextrose intermittent infusion         1,000 mg  200 mL/hr over 1 Hours Intravenous EVERY 8 HOURS 25 0954      25 1000  vancomycin (VANCOCIN) 1,500 mg in 0.9% NaCl 265 mL intermittent infusion         1,500 mg  over 90 Minutes Intravenous ONCE 25 0954              Contrast Orders - past 72 hours (72h ago, onward)      Start     Dose/Rate Route Frequency Stop    25  iopamidol (ISOVUE-370) solution 90 mL         90 mL Intravenous ONCE 25            [email or web address] Prediction of Planned Initial Vancomycin Regimen  Loading dose: 1500 mg at 10:00 2025.  Regimen: 1000 mg IV every 8 hours.  Start time: 22:00 on 2025  Exposure target: AUC24 (range)400-600 mg/L.hr   AUC24,ss: 555 mg/L.hr  Probability of AUC24 > 400: 80 %  Ctrough,ss: 17.6 mg/L  Probability of Ctrough,ss > 20: 40 %  Probability of nephrotoxicity (Lodise CARMELA ): 13 %          Plan:  Start vancomycin 1500 mg x 1 LD followed by 1000 mg IV q8h.   Vancomycin monitoring method: AUC  Vancomycin therapeutic monitoring goal: 400-600 mg*h/L  Pharmacy will check vancomycin levels as appropriate in 1-3 Days.    Serum creatinine levels will be ordered daily for the  first week of therapy and at least twice weekly for subsequent weeks.      [facility]

## 2025-02-20 NOTE — H&P (VIEW-ONLY)
Consultation - Surgery  Julio Stoll,  2000, MRN [id number]    Admitting Dx: Abdominal pain, epigastric [R10.13]  Leukocytosis, unspecified type [D72.829]  Acute pancreatitis, unspecified complication status, unspecified pancreatitis type [K85.90]  Abnormal LFTs [R79.89]  Other ascites [R18.8]    PCP: No Ref-Primary, Physician, None   Code status:  Full Code       Extended Emergency Contact Information  Primary Emergency Contact: Fabio Stoll  Mobile Phone: [phone]  Relation: Father  Secondary Emergency Contact: Emily Rand  Mobile Phone: [phone]  Relation: Mother       Assessment and Plan   Impression:  Gallstone pancreatitis.  His LFTs are trending down but they did not repeat his lipase and he is still having abdominal pain.  I think we need to see what is happening with that.  GI had been formally consulted but for what ever reason we were not actually formally consulted.  In an ideal world, we are both involved so that we can try to coordinate laparoscopic cholecystectomy with potential ERCP.  Explained to him that he does not need his gallbladder out immediately but if we can coordinate it to be done at the same time it saves him another general anesthetic.      Plan:  Have asked them to add a lipase to the labs.  Will discuss with gastroenterology then as to the best timing for this patient.  I am in favor of him having an ERCP as his common bile duct is somewhat dilated and he is having ongoing pain.           Chief Complaint <principal problem not specified>       HPI     This is a 24 year old year old male who had an abrupt onset of abdominal pain yesterday morning.  Became severe enough last night that he presented to the emergency room.  He has never had pain like this before.  He has had no previous abdominal surgeries.  CT scan of the abdomen shows pancreatitis mildly dilated common and intrahepatic bile ducts.  Lipase was greater than 3000.  Although his other LFTs have  dropped a little bit since last night, they did not repeat the lipase this morning.       Medical History  Patient Active Problem List   Diagnosis    History of chicken pox    History of difficulty sleeping    Nocturnal enuresis    Abdominal pain, epigastric    Other ascites    Abnormal LFTs    Leukocytosis, unspecified type    Acute pancreatitis, unspecified complication status, unspecified pancreatitis type       [unfilled] Surgical History  History reviewed. No pertinent surgical history.     Social History  Social History     Tobacco Use    Smoking status: Never    Smokeless tobacco: Former   Vaping Use    Vaping status: Former       Allergies  No Known Allergies Family History  Reviewed, and family history is not on file.  The Family history is not pertinent to the patients chief complaint. Psychosocial Needs  Social History     Social History Narrative    Not on file     Additional psychosocial needs reviewed per nursing assessment.     Prior to Admission Medications   Current Outpatient Medications   Medication Instructions    ibuprofen (ADVIL/MOTRIN) 800 mg, EVERY 8 HOURS PRN    omeprazole (PRILOSEC) 20 mg, Oral, DAILY    sucralfate (CARAFATE) 1 g, 2 TIMES DAILY           Review of Systems:  Pertinent items are noted in HPI. Physical Exam:  Temp:  [98.1  F (36.7  C)-99.4  F (37.4  C)] 99  F (37.2  C)  Pulse:  [] 108  Resp:  [18-20] 20  BP: (119-141)/(66-89) 130/67  SpO2:  [96 %-100 %] 96 %    General appearance: alert, appears stated age, and cooperative, is a little uncomfortable  Eyes: No scleral icterus  Lungs: Breathing comfortably.  No shortness of breath  Heart: He is tachycardic.  Abdomen: Soft, flat, tender across the epigastric region.  Extremities: extremities, peripheral pulses and reflexes normal  Pulses: 2+ and symmetric  Skin: Skin color, texture, turgor normal. No rashes or lesions  Neurologic: Grossly normal       Pertinent Labs  Lab Results: personally reviewed.   Lab Results   Component  Value Date    WBC 25.5 02/20/2025    HGB 16.1 02/20/2025    HCT 47.9 02/20/2025    MCV 84 02/20/2025     02/20/2025   Lipase greater than 3000 last night, pending this morning.  Bilirubin normal    AST 84  Alk phos was 178 down to 125 this morning     Pertinent Radiology  Radiology Results: Personally reviewed image/s and Personally reviewed impression/s  EKG Results: not reviewed.

## 2025-02-21 PROBLEM — K85.10 GALLSTONE PANCREATITIS: Status: ACTIVE | Noted: 2025-02-21

## 2025-02-21 LAB
ALBUMIN SERPL BCG-MCNC: 3.3 G/DL (ref 3.5–5.2)
ALP SERPL-CCNC: 89 U/L (ref 40–150)
ALT SERPL W P-5'-P-CCNC: 222 U/L (ref 0–70)
ANION GAP SERPL CALCULATED.3IONS-SCNC: 4 MMOL/L (ref 7–15)
AST SERPL W P-5'-P-CCNC: 33 U/L (ref 0–45)
BASOPHILS # BLD AUTO: 0 10E3/UL (ref 0–0.2)
BASOPHILS NFR BLD AUTO: 0 %
BILIRUB DIRECT SERPL-MCNC: 0.24 MG/DL (ref 0–0.3)
BILIRUB SERPL-MCNC: 0.7 MG/DL
BUN SERPL-MCNC: 8.3 MG/DL (ref 6–20)
CALCIUM SERPL-MCNC: 8.6 MG/DL (ref 8.8–10.4)
CHLORIDE SERPL-SCNC: 103 MMOL/L (ref 98–107)
CREAT SERPL-MCNC: 0.61 MG/DL (ref 0.67–1.17)
EGFRCR SERPLBLD CKD-EPI 2021: >90 ML/MIN/1.73M2
EOSINOPHIL # BLD AUTO: 0 10E3/UL (ref 0–0.7)
EOSINOPHIL NFR BLD AUTO: 0 %
ERYTHROCYTE [DISTWIDTH] IN BLOOD BY AUTOMATED COUNT: 13.9 % (ref 10–15)
GLUCOSE SERPL-MCNC: 116 MG/DL (ref 70–99)
HCO3 SERPL-SCNC: 29 MMOL/L (ref 22–29)
HCT VFR BLD AUTO: 38.9 % (ref 40–53)
HGB BLD-MCNC: 12.8 G/DL (ref 13.3–17.7)
HGB BLD-MCNC: 12.9 G/DL (ref 13.3–17.7)
IMM GRANULOCYTES # BLD: 0.1 10E3/UL
IMM GRANULOCYTES NFR BLD: 1 %
LIPASE SERPL-CCNC: 1649 U/L (ref 13–60)
LYMPHOCYTES # BLD AUTO: 1.6 10E3/UL (ref 0.8–5.3)
LYMPHOCYTES NFR BLD AUTO: 9 %
MCH RBC QN AUTO: 28.2 PG (ref 26.5–33)
MCHC RBC AUTO-ENTMCNC: 33.2 G/DL (ref 31.5–36.5)
MCV RBC AUTO: 85 FL (ref 78–100)
MONOCYTES # BLD AUTO: 1.2 10E3/UL (ref 0–1.3)
MONOCYTES NFR BLD AUTO: 7 %
NEUTROPHILS # BLD AUTO: 15.2 10E3/UL (ref 1.6–8.3)
NEUTROPHILS NFR BLD AUTO: 84 %
NRBC # BLD AUTO: 0 10E3/UL
NRBC BLD AUTO-RTO: 0 /100
PHOSPHATE SERPL-MCNC: 2.4 MG/DL (ref 2.5–4.5)
PLATELET # BLD AUTO: 294 10E3/UL (ref 150–450)
POTASSIUM SERPL-SCNC: 3.9 MMOL/L (ref 3.4–5.3)
PROT SERPL-MCNC: 5.9 G/DL (ref 6.4–8.3)
RBC # BLD AUTO: 4.57 10E6/UL (ref 4.4–5.9)
SODIUM SERPL-SCNC: 136 MMOL/L (ref 135–145)
WBC # BLD AUTO: 18.1 10E3/UL (ref 4–11)

## 2025-02-21 PROCEDURE — 84100 ASSAY OF PHOSPHORUS: CPT | Performed by: INTERNAL MEDICINE

## 2025-02-21 PROCEDURE — 82248 BILIRUBIN DIRECT: CPT | Performed by: INTERNAL MEDICINE

## 2025-02-21 PROCEDURE — 36415 COLL VENOUS BLD VENIPUNCTURE: CPT

## 2025-02-21 PROCEDURE — 99233 SBSQ HOSP IP/OBS HIGH 50: CPT | Performed by: EMERGENCY MEDICINE

## 2025-02-21 PROCEDURE — 85018 HEMOGLOBIN: CPT

## 2025-02-21 PROCEDURE — 250N000013 HC RX MED GY IP 250 OP 250 PS 637: Performed by: EMERGENCY MEDICINE

## 2025-02-21 PROCEDURE — 84460 ALANINE AMINO (ALT) (SGPT): CPT | Performed by: INTERNAL MEDICINE

## 2025-02-21 PROCEDURE — 84075 ASSAY ALKALINE PHOSPHATASE: CPT | Performed by: INTERNAL MEDICINE

## 2025-02-21 PROCEDURE — 82247 BILIRUBIN TOTAL: CPT | Performed by: INTERNAL MEDICINE

## 2025-02-21 PROCEDURE — 120N000001 HC R&B MED SURG/OB

## 2025-02-21 PROCEDURE — 84450 TRANSFERASE (AST) (SGOT): CPT | Performed by: INTERNAL MEDICINE

## 2025-02-21 PROCEDURE — 85025 COMPLETE CBC W/AUTO DIFF WBC: CPT | Performed by: INTERNAL MEDICINE

## 2025-02-21 PROCEDURE — 84155 ASSAY OF PROTEIN SERUM: CPT | Performed by: INTERNAL MEDICINE

## 2025-02-21 PROCEDURE — 83690 ASSAY OF LIPASE: CPT

## 2025-02-21 PROCEDURE — 250N000009 HC RX 250: Performed by: INTERNAL MEDICINE

## 2025-02-21 PROCEDURE — 250N000011 HC RX IP 250 OP 636: Performed by: INTERNAL MEDICINE

## 2025-02-21 PROCEDURE — 99231 SBSQ HOSP IP/OBS SF/LOW 25: CPT | Performed by: SPECIALIST

## 2025-02-21 PROCEDURE — 250N000011 HC RX IP 250 OP 636: Mod: JZ | Performed by: INTERNAL MEDICINE

## 2025-02-21 PROCEDURE — 258N000003 HC RX IP 258 OP 636: Performed by: INTERNAL MEDICINE

## 2025-02-21 PROCEDURE — 250N000013 HC RX MED GY IP 250 OP 250 PS 637: Performed by: INTERNAL MEDICINE

## 2025-02-21 PROCEDURE — 36415 COLL VENOUS BLD VENIPUNCTURE: CPT | Performed by: INTERNAL MEDICINE

## 2025-02-21 RX ORDER — VANCOMYCIN HYDROCHLORIDE 1 G/200ML
1000 INJECTION, SOLUTION INTRAVENOUS EVERY 8 HOURS
Status: DISCONTINUED | OUTPATIENT
Start: 2025-02-21 | End: 2025-02-21

## 2025-02-21 RX ORDER — OXYCODONE HYDROCHLORIDE 5 MG/1
10 TABLET ORAL EVERY 4 HOURS PRN
Status: DISCONTINUED | OUTPATIENT
Start: 2025-02-21 | End: 2025-02-25 | Stop reason: HOSPADM

## 2025-02-21 RX ORDER — IBUPROFEN 400 MG/1
400 TABLET, FILM COATED ORAL EVERY 4 HOURS PRN
Status: DISCONTINUED | OUTPATIENT
Start: 2025-02-21 | End: 2025-02-25 | Stop reason: HOSPADM

## 2025-02-21 RX ORDER — OXYCODONE HYDROCHLORIDE 5 MG/1
5 TABLET ORAL EVERY 4 HOURS PRN
Status: DISCONTINUED | OUTPATIENT
Start: 2025-02-21 | End: 2025-02-25 | Stop reason: HOSPADM

## 2025-02-21 RX ADMIN — IBUPROFEN 400 MG: 200 TABLET, FILM COATED ORAL at 18:48

## 2025-02-21 RX ADMIN — POTASSIUM & SODIUM PHOSPHATES POWDER PACK 280-160-250 MG 1 PACKET: 280-160-250 PACK at 16:53

## 2025-02-21 RX ADMIN — PANTOPRAZOLE SODIUM 40 MG: 40 INJECTION, POWDER, FOR SOLUTION INTRAVENOUS at 08:23

## 2025-02-21 RX ADMIN — SODIUM CHLORIDE: 0.9 INJECTION, SOLUTION INTRAVENOUS at 05:57

## 2025-02-21 RX ADMIN — THIAMINE HYDROCHLORIDE 100 MG: 100 INJECTION, SOLUTION INTRAMUSCULAR; INTRAVENOUS at 08:22

## 2025-02-21 RX ADMIN — VANCOMYCIN HYDROCHLORIDE 1000 MG: 1 INJECTION, SOLUTION INTRAVENOUS at 00:30

## 2025-02-21 RX ADMIN — POTASSIUM & SODIUM PHOSPHATES POWDER PACK 280-160-250 MG 1 PACKET: 280-160-250 PACK at 08:23

## 2025-02-21 RX ADMIN — HYDROMORPHONE HYDROCHLORIDE 1 MG: 1 INJECTION, SOLUTION INTRAMUSCULAR; INTRAVENOUS; SUBCUTANEOUS at 03:00

## 2025-02-21 RX ADMIN — OXYCODONE HYDROCHLORIDE 5 MG: 5 TABLET ORAL at 10:40

## 2025-02-21 RX ADMIN — OXYCODONE HYDROCHLORIDE 5 MG: 5 TABLET ORAL at 14:44

## 2025-02-21 RX ADMIN — SODIUM CHLORIDE: 0.9 INJECTION, SOLUTION INTRAVENOUS at 15:13

## 2025-02-21 RX ADMIN — FOLIC ACID 1 MG: 5 INJECTION, SOLUTION INTRAMUSCULAR; INTRAVENOUS; SUBCUTANEOUS at 08:22

## 2025-02-21 RX ADMIN — POTASSIUM & SODIUM PHOSPHATES POWDER PACK 280-160-250 MG 1 PACKET: 280-160-250 PACK at 11:49

## 2025-02-21 RX ADMIN — MEROPENEM 1 G: 1 INJECTION, POWDER, FOR SOLUTION INTRAVENOUS at 03:04

## 2025-02-21 ASSESSMENT — ACTIVITIES OF DAILY LIVING (ADL)
ADLS_ACUITY_SCORE: 30

## 2025-02-21 NOTE — PLAN OF CARE
Problem: Adult Inpatient Plan of Care  Goal: Optimal Comfort and Wellbeing  Intervention: Monitor Pain and Promote Comfort  Recent Flowsheet Documentation  Taken 2/21/2025 1040 by Rogerio Carrion, RN  Pain Management Interventions:   medication (see MAR)   heat applied  Taken 2/21/2025 0822 by Rogerio Carrion, RN  Pain Management Interventions: heat applied     Pt alert and oriented x4. Lungs clear. Bowels active. Reports of abdominal pain. Gave PO oxycodone. Up ambulating in hallway independently. Tolerated clear liquids this afternoon. Phos 2.4 today. Gave 2 doses, still need 1 this evening. Protocol ordered for tomorrow AM.

## 2025-02-21 NOTE — PROGRESS NOTES
Feeling much better today.  Still some pain but feels hungry.  Afebrile, vital signs stable.  Heart rate is improved.    Physical exam:  Looks less painful.  Abdomen is soft, still a little tender in the epigastric region but much less than yesterday.    Laboratories:  White blood count 18.1, trending down  Lipase 1649, trending down  LFTs all improving.    Impression: Gallstone pancreatitis.  Passed his gallstone but did have fairly significant pancreatitis.  He does look better today.  I think we can advance his diet.  If doing well potentially home tomorrow with then interval laparoscopic cholecystectomy electively down the road.    I am not sure who ordered vancomycin and meropenem on this patient but he absolutely does not need IV antibiotics for pancreatitis.  His elevated white count is because of the pancreatitis and inflammation, not infection.  I have discontinued both of them.

## 2025-02-21 NOTE — PROGRESS NOTES
"  GASTROENTEROLOGY PROGRESS NOTE     SUBJECTIVE   The patient reports having improvement of abdominal pain.  Last received IV Dilaudid at 3 AM. +flatus.  He is hungry     OBJECTIVE     Vitals Blood pressure 116/55, pulse 82, temperature 99.5  F (37.5  C), temperature source Oral, resp. rate 16, height 1.727 m (5' 8\"), weight 78.7 kg (173 lb 6.4 oz), SpO2 93%.          Physical Exam  General: awake, alert, responds appropriately   Cardiovascular: S1S2, no edema   Chest: lungs are clear    Abdomen: +bs,soft, mild upper abdominal tenderness   Neurologic: grossly intact        LABORATORY    ELECTROLYTE PANEL   Recent Labs   Lab 02/21/25  0535 02/20/25  1615 02/20/25  0545 02/19/25  1736     --  141 140   POTASSIUM 3.9  --  3.9 4.0   CHLORIDE 103  --  105 101   CO2 29  --  24 27   * 112* 162* 164*   CR 0.61*  --  0.71 0.72   BUN 8.3  --  10.9 12.8      HEMATOLOGY PANEL   Recent Labs   Lab 02/21/25  1009 02/21/25  0535 02/20/25  0823 02/20/25  0545 02/19/25  1736   HGB 12.8* 12.9*  --  16.1 17.1   MCV  --  85  --  84 85   WBC  --  18.1*  --  25.5* 25.5*   PLT  --  294  --  396 405   INR  --   --  1.02  --   --       LIVER AND PANCREAS PANEL   Recent Labs   Lab 02/21/25  0535 02/20/25  0545 02/19/25  1736   AST 33 84* 245*   * 398* 622*   ALKPHOS 89 125 178*   BILITOTAL 0.7 0.8 1.1   LIPASE 1,649* 2,780* >3,000*     IMAGING STUDIES    EXAM: US ABDOMEN LIMITED  LOCATION: [facility]  DATE: 2/19/2025     INDICATION: pancreatitis  COMPARISON: CT 02/19/2025  TECHNIQUE: Limited abdominal ultrasound.     FINDINGS:     GALLBLADDER: Cholelithiasis. No visible wall thickening. Sonographic Early's sign negative. Mild gallbladder distention.     BILE DUCTS: Mild extra hepatic biliary dilatation. Common duct measures 8 mm.     LIVER: Hepatic steatosis.     RIGHT KIDNEY: No hydronephrosis.     PANCREAS: Partial obscuration by bowel gas. Changes of pancreatitis better demonstrated on CT.      "                                                                 IMPRESSION:  1.  Cholelithiasis and mild gallbladder distention.  2.  Sonographic Early's sign negative.  3.  Changes of pancreatitis better demonstrated on CT.     EXAM: CT ABDOMEN PELVIS W CONTRAST  LOCATION: [facility]  DATE: 2/19/2025     INDICATION: epigastric abd pain  COMPARISON: None.  TECHNIQUE: CT scan of the abdomen and pelvis was performed following injection of IV contrast. Multiplanar reformats were obtained. Dose reduction techniques were used.  CONTRAST: isovue 370 90ml     FINDINGS:   LOWER CHEST: Normal.     HEPATOBILIARY: Mild intrahepatic biliary dilation. Mild extrahepatic biliary dilation measuring non-10 mm. No calcified gallstones or biliary ductal stones. No suspicious liver lesion.     PANCREAS: Moderate ascites in the upper abdomen centered around the pancreas. Mild pancreatic edema but homogeneous enhancement. No pancreatic mass or calcification. No ductal dilation.     SPLEEN: Normal.     ADRENAL GLANDS: Normal.     KIDNEYS/BLADDER: Normal.     BOWEL: Moderate upper abdominal ascites. Trace free fluid in the pelvis. No free air. Small bowel normal in caliber. No bowel wall thickening or abnormal enhancement. Normal caliber appendix.     LYMPH NODES: Normal.     VASCULATURE: Normal.     PELVIC ORGANS: Normal.     MUSCULOSKELETAL: Normal.                                                                      IMPRESSION:   1.  Acute (interstitial edematous) pancreatitis without evidence for necrosis.  2.  Moderate ascites.  3.  Mild intrahepatic and extrahepatic biliary dilation. No calcified gallstones are ductal stones. Consider follow-up right upper quadrant ultrasound for further evaluation.   I have reviewed the current diagnostic and laboratory tests.              EUS 2/20/25  Findings:       ENDOSONOGRAPHIC FINDING: :        Multiple stones were visualized endosonographically in the gallbladder         body. The stones measured up to 10 mm in greatest dimension. The stones        were oval. They were characterized by shadowing.        The celiac axis including lymph nodes was unremarkable.        The left adrenal was examined and normal.        The examined portion of the left lobe of the liver and the gastrohepatic        ligament were evaluated and found to be without abnormalities.        The pancreatic parenchyma demonstrated no abnormalities. The pancreatic        duct measured 2.2 mm in the head, 1.1 mm in the body, and 0.8 mm in the        tail.        The ampulla was normal endoscopically and endosonographically. The bile        duct ranged in size from 2.2 mm to 5.4 mm without stones or sludge. The        bile duct wall was thickened to 2.0 mm. The gallbladder demonstrated        shadowing stones in the .        No endosonographic evidence of mass lesions or pathologic lymph nodes.                                                                                    Moderate Sedation:        GA   Impression:            - Multiple stones were visualized endosonographically                          in the gallbladder body.                          - No specimens collected.   Recommendation:        - Return patient to hospital [facility] for ongoing care.                          - Ice chips and sips - otherwise NPO.                                                                                      Rich Romano MD   ____________________   RICH ROMANO MD   2/20/2025 2:55:13 PM   IMPRESSION   Gallstone pancreatitis-- This 25 yo male presented with abdominal pain and was found to have abnormal liver tests and imaging with gallstones, acute pancreatitis, and mild intrahepatic and extrahepatic biliary dilation. EUS 2/20/25  showed gallstones in the gallbladder, but no choledocholithiasis. Suspect gallstone pancreatitis with passed cbd stone. LFTs are improving. Tentative plans in place for outpatient  cholecystectomy.          PLAN   Aim for pain control on oral agents.  Okay to start clear liquids.  If pain is controlled on oral agents okay to further advance to full liquids and then low-fat diet.  If tolerates diet advances, saline lock iv fluids.  I encouraged the patient to ambulate  LFTs should continue to improve.  Tentatively plan for follow up with surgery as outpatient for cholecystectomy.   [facility] will sign off. Please call with questions/concerns.     Total time spent on this encounter=35minutes.         Shabana Syed PA-C  Thank you for the opportunity to participate in the care of this patient.   Please feel free to call me with any questions or concerns.  Phone number [phone].

## 2025-02-21 NOTE — PROGRESS NOTES
"Daily Progress Note    Assessment/Plan:  24-year-old male admitted with acute gallstone pancreatitis with cholelithiasis, ascites and hepatic steatosis.    Gallstone pancreatitis: Ultrasound showed cholelithiasis and mild gallbladder distention.  GI and surgery following.  CT showed radiographic pancreatitis.  There is also moderate ascites.  On February 20 underwent EUS showing multiple stones visualized in the gallbladder body.  Seen by surgery today and started on clear liquids.  Still having a lot of pain this morning.  Lipase has improved from greater than 3000 to 1649 today.  Still with elevated white count at 18.1 but improved from 25.5, no gallbladder wall thickening, could be stress demargination.  Initial blood culture showing gram-positive cocci clusters, suspect contamination, likely contaminant and discussed on antibiotic rounds and will stop IV Vanco.  Meropenem discontinued by surgery given no clear evidence of gallbladder infection, will continue to monitor white count and vital signs.  He has had no fever or other clinical evidence of infection.  Clinically Significant Risk Factors           # Hypocalcemia: Lowest Ca = 8.6 mg/dL in last 2 days, will monitor and replace as appropriate  # Hypercalcemia: corrected calcium is >10.1, will monitor as appropriate    # Hypoalbuminemia: Lowest albumin = 3.3 g/dL at 2/21/2025  5:35 AM, will monitor as appropriate                # Overweight: Estimated body mass index is 26.37 kg/m  as calculated from the following:    Height as of this encounter: 1.727 m (5' 8\").    Weight as of this encounter: 78.7 kg (173 lb 6.4 oz)., PRESENT ON ADMISSION               Code status:Full Code        Barriers to Discharge: Abdominal pain, IV antibiotics, IV fluids, pancreatitis    Disposition: Anticipate discharge in 2 to 3 days    Subjective:  Julio is new to me today, chart reviewed and discussed with staff.  He is currently having a fair amount of right upper " quadrant/epigastric pain.  No fevers or chills, no chest pain or shortness of breath.  No current nausea vomiting or diarrhea.        Current Medications Reviewed via EHR List    Objective:  Vital signs in last 24 hours:  [unfilled]  .prog  Weight:   @THISENCWEIGHTS(1)@  Weight change: 2.586 kg (5 lb 11.2 oz)  Body mass index is 26.37 kg/m .    Intake/Output last 3 shifts:  I/O last 3 completed shifts:  In: 1982.5 [P.O.:200; I.V.:1782.5]  Out: 2175 [Urine:2175]  Intake/Output this shift:  No intake/output data recorded.    Physical Exam:  General: No apparent distress  CV: Rate rate rhythm  Lungs: Clear to auscultation  Abdomen: Soft with right upper quadrant tenderness.        Imaging:  Personally Reviewed.  US Abdomen Limited    Result Date: 2/19/2025  EXAM: US ABDOMEN LIMITED LOCATION: [facility] DATE: 2/19/2025 INDICATION: pancreatitis COMPARISON: CT 02/19/2025 TECHNIQUE: Limited abdominal ultrasound. FINDINGS: GALLBLADDER: Cholelithiasis. No visible wall thickening. Sonographic Early's sign negative. Mild gallbladder distention. BILE DUCTS: Mild extra hepatic biliary dilatation. Common duct measures 8 mm. LIVER: Hepatic steatosis. RIGHT KIDNEY: No hydronephrosis. PANCREAS: Partial obscuration by bowel gas. Changes of pancreatitis better demonstrated on CT.     IMPRESSION: 1.  Cholelithiasis and mild gallbladder distention. 2.  Sonographic Early's sign negative. 3.  Changes of pancreatitis better demonstrated on CT.     XR Chest 1 View    Result Date: 2/19/2025  EXAM: XR CHEST 1 VIEW LOCATION: [facility] DATE: 2/19/2025 INDICATION: weak COMPARISON: None.     IMPRESSION: Negative chest.    CT Abdomen Pelvis w Contrast    Result Date: 2/19/2025  EXAM: CT ABDOMEN PELVIS W CONTRAST LOCATION: [facility] DATE: 2/19/2025 INDICATION: epigastric abd pain COMPARISON: None. TECHNIQUE: CT scan of the abdomen and pelvis was performed following  "injection of IV contrast. Multiplanar reformats were obtained. Dose reduction techniques were used. CONTRAST: isovue 370 90ml FINDINGS: LOWER CHEST: Normal. HEPATOBILIARY: Mild intrahepatic biliary dilation. Mild extrahepatic biliary dilation measuring non-10 mm. No calcified gallstones or biliary ductal stones. No suspicious liver lesion. PANCREAS: Moderate ascites in the upper abdomen centered around the pancreas. Mild pancreatic edema but homogeneous enhancement. No pancreatic mass or calcification. No ductal dilation. SPLEEN: Normal. ADRENAL GLANDS: Normal. KIDNEYS/BLADDER: Normal. BOWEL: Moderate upper abdominal ascites. Trace free fluid in the pelvis. No free air. Small bowel normal in caliber. No bowel wall thickening or abnormal enhancement. Normal caliber appendix. LYMPH NODES: Normal. VASCULATURE: Normal. PELVIC ORGANS: Normal. MUSCULOSKELETAL: Normal.     IMPRESSION: 1.  Acute (interstitial edematous) pancreatitis without evidence for necrosis. 2.  Moderate ascites. 3.  Mild intrahepatic and extrahepatic biliary dilation. No calcified gallstones are ductal stones. Consider follow-up right upper quadrant ultrasound for further evaluation.       Lab Results:  Personally Reviewed.   Fingerstick Blood Glucose: @SCMBZOS16LUM(POCGLUFGR:10)@    Last Hbg A1C: No results found for: \"HGBA1C\"   Lab Results   Component Value Date    INR 1.02 02/20/2025     Recent Results (from the past 24 hours)   UPPER EUS    Collection Time: 02/20/25  2:08 PM   Result Value Ref Range    Upper EUS       [facility]  [address] [place] [address]  _______________________________________________________________________________  Patient Name: Julio Stoll           Procedure Date: 2/20/2025 2:08 PM  MRN: [id number]                       Account Number: [id number]  YOB: 2000              Admit Type: Inpatient  Age: 24                               Room: [place] [address]  Note Status: Finalized       "          Attending MD: RICH DENNISON MD,   Instrument Name: EUS Linear Scope [id number]   _______________________________________________________________________________     Procedure:             Upper EUS  Indications:           Gallstone pancreatitis  Providers:             RICH DENNISON MD  Patient Profile:       This is a 24 year old male.  Referring MD:            Medicines:             General Anesthesia  Complications:         No immediate complications.  _______________________________________________________________________________  Procedure:              Pre-Anesthesia Assessment:                         - Prior to the procedure, a History and Physical was                          performed, and patient medications, allergies and                          sensitivities were reviewed. The patient's tolerance                          of previous anesthesia was reviewed.                         After obtaining informed consent, the endoscope was                          passed under direct vision. Throughout the procedure,                          the patient's blood pressure, pulse, and oxygen                          saturations were monitored continuously. The EUS                          linear scope was introduced through the mouth, and                          advanced to the second part of duodenum. The upper EUS                          was accomplished without difficulty. The patient                          tolerated the procedure well.                                                                                    Findings:       ENDOSONOGRAPHIC FINDING: :       Multiple stones were visualized endosonographically in the gallbladder        body. The stones measured up to 10 mm in greatest dimension. The stones        were oval. They were characterized by shadowing.       The celiac axis including lymph nodes was unremarkable.       The left adrenal was examined and normal.       The examined  portion of the left lobe of the liver and the gastrohepatic        ligament were evaluated and found to be without abnormalities.       The pancreatic parenchyma demonstrated no abnormalities. The pancreatic        duct measured 2.2 mm in the head, 1.1 mm in the body, and 0.8 mm in the        tail.       The ampulla was normal endoscopically and endosonographically. The bile        duct ranged in size from 2.2 mm to 5.4 mm without stones or sludge. The        bile duct wall was thickened to 2.0 mm. The gallbladder demonstrated        shadowing stones in the .       No endosonographic evidence of mass lesion s or pathologic lymph nodes.                                                                                   Moderate Sedation:       GA  Impression:            - Multiple stones were visualized endosonographically                          in the gallbladder body.                         - No specimens collected.  Recommendation:        - Return patient to hospital [facility] for ongoing care.                         - Ice chips and sips - otherwise NPO.                                                                                     Rich Romano MD  ____________________  RICH ROMANO MD  2/20/2025 2:55:13 PM  I was physically present for the entire viewing portion of the exam.  __________________________  Signature of teaching physician  [id number] ROMANO MD  Number of Addenda: 0    Note Initiated On: 2/20/2025 2:08 PM  Scope In: 2:24:37 PM  Scope Out: 2:36:24 PM     Glucose by meter    Collection Time: 02/20/25  4:15 PM   Result Value Ref Range    GLUCOSE BY METER POCT 112 (H) 70 - 99 mg/dL   Renal panel    Collection Time: 02/21/25  5:35 AM   Result Value Ref Range    Sodium 136 135 - 145 mmol/L    Potassium 3.9 3.4 - 5.3 mmol/L    Chloride 103 98 - 107 mmol/L    Carbon Dioxide (CO2) 29 22 - 29 mmol/L    Anion Gap 4 (L) 7 - 15 mmol/L    Glucose 116 (H) 70 - 99 mg/dL    Urea Nitrogen 8.3 6.0 - 20.0 mg/dL     Creatinine 0.61 (L) 0.67 - 1.17 mg/dL    GFR Estimate >90 >60 mL/min/1.73m2    Calcium 8.6 (L) 8.8 - 10.4 mg/dL    Albumin 3.3 (L) 3.5 - 5.2 g/dL    Phosphorus 2.4 (L) 2.5 - 4.5 mg/dL   Lipase    Collection Time: 02/21/25  5:35 AM   Result Value Ref Range    Lipase 1,649 (H) 13 - 60 U/L   ALT    Collection Time: 02/21/25  5:35 AM   Result Value Ref Range     (H) 0 - 70 U/L   AST    Collection Time: 02/21/25  5:35 AM   Result Value Ref Range    AST 33 0 - 45 U/L   Alkaline phosphatase    Collection Time: 02/21/25  5:35 AM   Result Value Ref Range    Alkaline Phosphatase 89 40 - 150 U/L   Bilirubin direct    Collection Time: 02/21/25  5:35 AM   Result Value Ref Range    Bilirubin Direct 0.24 0.00 - 0.30 mg/dL   Bilirubin  total    Collection Time: 02/21/25  5:35 AM   Result Value Ref Range    Bilirubin Total 0.7 <=1.2 mg/dL   Protein total    Collection Time: 02/21/25  5:35 AM   Result Value Ref Range    Protein Total 5.9 (L) 6.4 - 8.3 g/dL   CBC with platelets and differential    Collection Time: 02/21/25  5:35 AM   Result Value Ref Range    WBC Count 18.1 (H) 4.0 - 11.0 10e3/uL    RBC Count 4.57 4.40 - 5.90 10e6/uL    Hemoglobin 12.9 (L) 13.3 - 17.7 g/dL    Hematocrit 38.9 (L) 40.0 - 53.0 %    MCV 85 78 - 100 fL    MCH 28.2 26.5 - 33.0 pg    MCHC 33.2 31.5 - 36.5 g/dL    RDW 13.9 10.0 - 15.0 %    Platelet Count 294 150 - 450 10e3/uL    % Neutrophils 84 %    % Lymphocytes 9 %    % Monocytes 7 %    % Eosinophils 0 %    % Basophils 0 %    % Immature Granulocytes 1 %    NRBCs per 100 WBC 0 <1 /100    Absolute Neutrophils 15.2 (H) 1.6 - 8.3 10e3/uL    Absolute Lymphocytes 1.6 0.8 - 5.3 10e3/uL    Absolute Monocytes 1.2 0.0 - 1.3 10e3/uL    Absolute Eosinophils 0.0 0.0 - 0.7 10e3/uL    Absolute Basophils 0.0 0.0 - 0.2 10e3/uL    Absolute Immature Granulocytes 0.1 <=0.4 10e3/uL    Absolute NRBCs 0.0 10e3/uL   Hemoglobin    Collection Time: 02/21/25 10:09 AM   Result Value Ref Range    Hemoglobin 12.8 (L) 13.3 -  17.7 g/dL       Medically Ready for Discharge: Anticipated in 2-4 Days       Advanced Care Planning    Medical Decision Making       50 MINUTES SPENT BY ME on the date of service doing chart review, history, exam, documentation & further activities per the note.      Bernabe Salazar MD  Date: 2/21/2025  Time: 10:35 AM  [facility] Medicine

## 2025-02-21 NOTE — PLAN OF CARE
Problem: Adult Inpatient Plan of Care  Goal: Absence of Hospital-Acquired Illness or Injury  Intervention: Identify and Manage Fall Risk  Recent Flowsheet Documentation  Taken 2/20/2025 1555 by Lilliana Babcock RN  Safety Promotion/Fall Prevention:   clutter free environment maintained   safety round/check completed     Problem: Adult Inpatient Plan of Care  Goal: Absence of Hospital-Acquired Illness or Injury  Intervention: Prevent Infection  Recent Flowsheet Documentation  Taken 2/20/2025 1555 by Lilliana Babcock RN  Infection Prevention: hand hygiene promoted     Problem: Adult Inpatient Plan of Care  Goal: Optimal Comfort and Wellbeing  Intervention: Monitor Pain and Promote Comfort  Recent Flowsheet Documentation  Taken 2/20/2025 1555 by Lilliana Babcock RN  Pain Management Interventions: emotional support     Problem: Pain Acute  Goal: Optimal Pain Control and Function  Outcome: Progressing  Intervention: Develop Pain Management Plan  Recent Flowsheet Documentation  Taken 2/20/2025 1555 by Lilliana Babcock RN  Pain Management Interventions: emotional support   Goal Outcome Evaluation:       Pt AxO4, VSS, RA. Pt rating pain 7/10, given 1mg dilaudid and hot packs with effect. IVF NS 100mL/hr. Pt ambulating independently. Ice chips/water only. Vanco and meropenem infused.

## 2025-02-21 NOTE — PLAN OF CARE
Problem: Pain Acute  Goal: Optimal Pain Control and Function  Outcome: Progressing  Intervention: Prevent or Manage Pain  Recent Flowsheet Documentation  Taken 2/21/2025 0200 by Lyly Thomason RN  Medication Review/Management: medications reviewed   Goal Outcome Evaluation:    Pt A/O x4, VSS on RA  C/o 6/10 abdominal pain, 1 g IV dilaudid has been helpful  Heat packs provided to help with abdominal cramping   Has maintained NPO besides ice chips and sips of water  Urinal at bedside  Tolerating NS at 100 mL an hour   Tolerating IV abx  Delta Hgb 16.1 down to 12.9, MD aware, order to recheck in 4 hours  Pt able to sleep in between cares

## 2025-02-21 NOTE — PROVIDER NOTIFICATION
Dr. Salazar notified 2/21/25 at 0750:    FYI Phosphorus 2.4 today. Was 5.3 yesterday. Do you want to order Phos RN manage order?

## 2025-02-21 NOTE — DISCHARGE INSTRUCTIONS
From your General Surgery Team:  You were seen by [facility] general surgery.  We will call you to schedule surgery. If you do not receive a call from our clinic in 1-2 weeks, please call us at [phone] to schedule an appointment at your convenience.

## 2025-02-22 LAB
ALBUMIN SERPL BCG-MCNC: 3.4 G/DL (ref 3.5–5.2)
ALP SERPL-CCNC: 84 U/L (ref 40–150)
ALT SERPL W P-5'-P-CCNC: 156 U/L (ref 0–70)
ANION GAP SERPL CALCULATED.3IONS-SCNC: 11 MMOL/L (ref 7–15)
AST SERPL W P-5'-P-CCNC: 22 U/L (ref 0–45)
BILIRUB SERPL-MCNC: 0.7 MG/DL
BUN SERPL-MCNC: 8.1 MG/DL (ref 6–20)
CALCIUM SERPL-MCNC: 8.6 MG/DL (ref 8.8–10.4)
CHLORIDE SERPL-SCNC: 103 MMOL/L (ref 98–107)
CREAT SERPL-MCNC: 0.59 MG/DL (ref 0.67–1.17)
EGFRCR SERPLBLD CKD-EPI 2021: >90 ML/MIN/1.73M2
ERYTHROCYTE [DISTWIDTH] IN BLOOD BY AUTOMATED COUNT: 13.6 % (ref 10–15)
GLUCOSE SERPL-MCNC: 89 MG/DL (ref 70–99)
HCO3 SERPL-SCNC: 25 MMOL/L (ref 22–29)
HCT VFR BLD AUTO: 36 % (ref 40–53)
HGB BLD-MCNC: 12.3 G/DL (ref 13.3–17.7)
LIPASE SERPL-CCNC: 257 U/L (ref 13–60)
MCH RBC QN AUTO: 28.9 PG (ref 26.5–33)
MCHC RBC AUTO-ENTMCNC: 34.2 G/DL (ref 31.5–36.5)
MCV RBC AUTO: 85 FL (ref 78–100)
PHOSPHATE SERPL-MCNC: 3.1 MG/DL (ref 2.5–4.5)
PLATELET # BLD AUTO: 287 10E3/UL (ref 150–450)
POTASSIUM SERPL-SCNC: 3.9 MMOL/L (ref 3.4–5.3)
PROT SERPL-MCNC: 6.3 G/DL (ref 6.4–8.3)
RBC # BLD AUTO: 4.25 10E6/UL (ref 4.4–5.9)
SODIUM SERPL-SCNC: 139 MMOL/L (ref 135–145)
WBC # BLD AUTO: 16.1 10E3/UL (ref 4–11)

## 2025-02-22 PROCEDURE — 250N000013 HC RX MED GY IP 250 OP 250 PS 637: Performed by: EMERGENCY MEDICINE

## 2025-02-22 PROCEDURE — 85014 HEMATOCRIT: CPT | Performed by: EMERGENCY MEDICINE

## 2025-02-22 PROCEDURE — 84155 ASSAY OF PROTEIN SERUM: CPT | Performed by: EMERGENCY MEDICINE

## 2025-02-22 PROCEDURE — 84100 ASSAY OF PHOSPHORUS: CPT | Performed by: EMERGENCY MEDICINE

## 2025-02-22 PROCEDURE — 258N000003 HC RX IP 258 OP 636: Performed by: INTERNAL MEDICINE

## 2025-02-22 PROCEDURE — 120N000001 HC R&B MED SURG/OB

## 2025-02-22 PROCEDURE — 99232 SBSQ HOSP IP/OBS MODERATE 35: CPT | Performed by: SURGERY

## 2025-02-22 PROCEDURE — 36415 COLL VENOUS BLD VENIPUNCTURE: CPT | Performed by: EMERGENCY MEDICINE

## 2025-02-22 PROCEDURE — 250N000011 HC RX IP 250 OP 636: Mod: JZ | Performed by: INTERNAL MEDICINE

## 2025-02-22 PROCEDURE — 250N000013 HC RX MED GY IP 250 OP 250 PS 637: Performed by: INTERNAL MEDICINE

## 2025-02-22 PROCEDURE — 82374 ASSAY BLOOD CARBON DIOXIDE: CPT | Performed by: EMERGENCY MEDICINE

## 2025-02-22 PROCEDURE — 99233 SBSQ HOSP IP/OBS HIGH 50: CPT | Performed by: EMERGENCY MEDICINE

## 2025-02-22 PROCEDURE — 250N000011 HC RX IP 250 OP 636: Performed by: INTERNAL MEDICINE

## 2025-02-22 PROCEDURE — 250N000009 HC RX 250: Performed by: INTERNAL MEDICINE

## 2025-02-22 PROCEDURE — 83690 ASSAY OF LIPASE: CPT | Performed by: EMERGENCY MEDICINE

## 2025-02-22 PROCEDURE — 250N000011 HC RX IP 250 OP 636: Performed by: EMERGENCY MEDICINE

## 2025-02-22 RX ORDER — PIPERACILLIN SODIUM, TAZOBACTAM SODIUM 3; .375 G/15ML; G/15ML
3.38 INJECTION, POWDER, LYOPHILIZED, FOR SOLUTION INTRAVENOUS ONCE
Status: COMPLETED | OUTPATIENT
Start: 2025-02-22 | End: 2025-02-22

## 2025-02-22 RX ORDER — PIPERACILLIN SODIUM, TAZOBACTAM SODIUM 3; .375 G/15ML; G/15ML
3.38 INJECTION, POWDER, LYOPHILIZED, FOR SOLUTION INTRAVENOUS EVERY 8 HOURS
Status: DISCONTINUED | OUTPATIENT
Start: 2025-02-22 | End: 2025-02-22

## 2025-02-22 RX ORDER — PIPERACILLIN SODIUM, TAZOBACTAM SODIUM 3; .375 G/15ML; G/15ML
3.38 INJECTION, POWDER, LYOPHILIZED, FOR SOLUTION INTRAVENOUS EVERY 8 HOURS
Status: DISCONTINUED | OUTPATIENT
Start: 2025-02-22 | End: 2025-02-23

## 2025-02-22 RX ADMIN — IBUPROFEN 400 MG: 200 TABLET, FILM COATED ORAL at 18:11

## 2025-02-22 RX ADMIN — FOLIC ACID 1 MG: 5 INJECTION, SOLUTION INTRAMUSCULAR; INTRAVENOUS; SUBCUTANEOUS at 08:42

## 2025-02-22 RX ADMIN — IBUPROFEN 400 MG: 200 TABLET, FILM COATED ORAL at 03:13

## 2025-02-22 RX ADMIN — THIAMINE HYDROCHLORIDE 100 MG: 100 INJECTION, SOLUTION INTRAMUSCULAR; INTRAVENOUS at 08:41

## 2025-02-22 RX ADMIN — SODIUM CHLORIDE: 0.9 INJECTION, SOLUTION INTRAVENOUS at 10:34

## 2025-02-22 RX ADMIN — PIPERACILLIN AND TAZOBACTAM 3.38 G: 3; .375 INJECTION, POWDER, FOR SOLUTION INTRAVENOUS at 08:42

## 2025-02-22 RX ADMIN — OXYCODONE HYDROCHLORIDE 5 MG: 5 TABLET ORAL at 08:40

## 2025-02-22 RX ADMIN — PIPERACILLIN AND TAZOBACTAM 3.38 G: 3; .375 INJECTION, POWDER, FOR SOLUTION INTRAVENOUS at 14:07

## 2025-02-22 RX ADMIN — OXYCODONE HYDROCHLORIDE 5 MG: 5 TABLET ORAL at 14:07

## 2025-02-22 RX ADMIN — HYDROMORPHONE HYDROCHLORIDE 0.5 MG: 1 INJECTION, SOLUTION INTRAMUSCULAR; INTRAVENOUS; SUBCUTANEOUS at 03:12

## 2025-02-22 RX ADMIN — PIPERACILLIN AND TAZOBACTAM 3.38 G: 3; .375 INJECTION, POWDER, FOR SOLUTION INTRAVENOUS at 22:11

## 2025-02-22 RX ADMIN — PANTOPRAZOLE SODIUM 40 MG: 40 INJECTION, POWDER, FOR SOLUTION INTRAVENOUS at 08:41

## 2025-02-22 RX ADMIN — SODIUM CHLORIDE: 0.9 INJECTION, SOLUTION INTRAVENOUS at 03:17

## 2025-02-22 ASSESSMENT — ACTIVITIES OF DAILY LIVING (ADL)
ADLS_ACUITY_SCORE: 32
ADLS_ACUITY_SCORE: 30
ADLS_ACUITY_SCORE: 32

## 2025-02-22 NOTE — PROVIDER NOTIFICATION
Paged Dr. Noe via "[employer] text page 2/22/25 at 6133:    Pt stated he was told cholecystectomy is scheduled in March but was sleepy and forgot what day. He wanted to know if he could have surgery while he is in the hospital or even sooner than in March. Are we waiting for lipase level and ALT to be normal before he can have surgery?    Provider response: Pancreatitis has to be resolved before surgery.

## 2025-02-22 NOTE — PROGRESS NOTES
General Surgery Progress Note    Subjective:   States that he feels a little bit better today compared to when he came in.  Continues to have stabbing pain in the epigastrium.  Denies nausea.  No appetite.    Vitals:    02/21/25 2359 02/22/25 0302 02/22/25 0319 02/22/25 0717   BP: 120/72 116/64  116/58   BP Location: Left arm   Left arm   Patient Position:       Cuff Size:  Adult Regular     Pulse: 75 109 85 73   Resp: 16 18  18   Temp: 99.7  F (37.6  C) 100.4  F (38  C)  99.4  F (37.4  C)   TempSrc: Oral Oral  Oral   SpO2: 93% 93% 94% 93%   Weight:       Height:           02/21 0700 - 02/22 0659  In: 800 [P.O.:800]  Out: 1600 [Urine:1600]    Physical Exam:  General: NAD, pleasant  ABD: Tender to palpation in the epigastrium    Recent Labs   Lab 02/22/25  0524   WBC 16.1*   HGB 12.3*   HCT 36.0*          Recent Labs   Lab 02/22/25  0524      CO2 25   BUN 8.1   ALBUMIN 3.4*   ALKPHOS 84   *   AST 22       Assessment:   24-year-old male with gallstone pancreatitis.  Continues to have leukocytosis and significant pain.    Plan:   Continue with medical management for pancreatitis.  Advance diet per GI.  Patient will have outpatient cholecystectomy for prevention of recurrent gallstone disease once his pancreatitis has resolved.    Ness Noe DO  General Surgeon  [facility]  Surgery [address]  [address]  [place] [address]  Office: [phone]  Employed by - [facility]

## 2025-02-22 NOTE — PROVIDER NOTIFICATION
Paged Dr. Salazar 2/22/25 at 1019:    Pt wants diet advanced. He has been tolerating clear liquids and stated pain has been better today compared to yesterday. Looks like GI signed off yesterday. Can we advance his diet?    Provider response: Okay to advance diet.

## 2025-02-22 NOTE — PLAN OF CARE
Problem: Adult Inpatient Plan of Care  Goal: Optimal Comfort and Wellbeing  Intervention: Monitor Pain and Promote Comfort  Recent Flowsheet Documentation  Taken 2/21/2025 1555 by Gamal Owens, RN  Pain Management Interventions:   distraction   emotional support   Goal Outcome Evaluation:    Problem: Adult Inpatient Plan of Care  Goal: Absence of Hospital-Acquired Illness or Injury  Intervention: Identify and Manage Fall Risk  Recent Flowsheet Documentation  Taken 2/21/2025 1700 by Gamal Owens, RN  Safety Promotion/Fall Prevention: clutter free environment maintained        Pt up to bathroom x 2 to void with mom walking with him. Pt denies dizziness. Pt steady with ambulation. C/O Headache and intermittent abdominal cramping. Pt has had Oxycodone per day RN at 1445 with some relief. Continues to have abdominal cramping and headache rated at a 3-4.

## 2025-02-22 NOTE — PLAN OF CARE
Problem: Adult Inpatient Plan of Care  Goal: Optimal Comfort and Wellbeing  Intervention: Monitor Pain and Promote Comfort  Recent Flowsheet Documentation  Taken 2/22/2025 0840 by Rogerio Carrion RN  Pain Management Interventions: medication (see MAR)    Pt pain 5/10, improved with PO oxycodone x2.  Had some ibuprofen x1 for headache.  Pt likes ice on his forehead for headache. Started pt on IV zosyn today. . This morning lipase was 257 and .  Phos 3.1, lab reordered for tomorrow. Plan on surgery outpatient. Ambulated independently. Family at bedside.     Pt tolerated full liquid and pain has been tolerable with PO oxycodone. Dr. Geller notified and ordered low fat diet.   NS lock now.

## 2025-02-22 NOTE — PROGRESS NOTES
"Daily Progress Note    Assessment/Plan:  24-year-old male admitted with acute gallstone pancreatitis with cholelithiasis and ascites and hepatic steatosis.  Underwent EUS on February 20 showing multiple stones visualized in the gallbladder body.  Overnight spiked a temperature of 101.7 and antibiotics were restarted.  Surgery following and planning for outpatient cholecystectomy.  GI signed off.    Cholecystitis: Initially started on meropenem and Vanco.  Surgery recommended discontinuation of meropenem given lack of evidence of cholecystitis.  Vancomycin also discontinued.  With fever spike and, Zosyn started this morning.  Continued low-grade temp was continued leukocytosis, he does have continued pain which improves with pain medication.  Currently on clear liquids.  Bacteremia: Blood cultures positive for GPC which is likely contaminant.  Vancomycin stopped on February 21, continue to monitor cultures, already on antibiotics for the above.  Clinically Significant Risk Factors           # Hypocalcemia: Lowest Ca = 8.6 mg/dL in last 2 days, will monitor and replace as appropriate     # Hypoalbuminemia: Lowest albumin = 3.3 g/dL at 2/21/2025  5:35 AM, will monitor as appropriate                # Overweight: Estimated body mass index is 26.37 kg/m  as calculated from the following:    Height as of this encounter: 1.727 m (5' 8\").    Weight as of this encounter: 78.7 kg (173 lb 6.4 oz)., PRESENT ON ADMISSION               Code status:Full Code        Barriers to Discharge: Fever, IV antibiotics    Disposition: Anticipate discharge in 1 to 2 days    Subjective:  Julio reports continued upper abdominal pain which waxes and wanes and improved with medications.  Overall he reports the pain has improved.  Had no fever last night.  No cough or shortness of breath or dysuria or diarrhea.  No new skin rash.        Current Medications Reviewed via EHR List    Objective:  Vital signs in last 24 " hours:  [unfilled]  .prog  Weight:   @THISENCWEIGHTS(1)@  Weight change:   Body mass index is 26.37 kg/m .    Intake/Output last 3 shifts:  I/O last 3 completed shifts:  In: 800 [P.O.:800]  Out: 1600 [Urine:1600]  Intake/Output this shift:  No intake/output data recorded.    Physical Exam:  General: Lying in bed, no apparent distress  CV: Rate rate rhythm  Lungs: Clear to auscultation  Abdomen: Right upper quadrant tenderness, no rebound or guarding        Imaging:  Personally Reviewed.  US Abdomen Limited    Result Date: 2/19/2025  EXAM: US ABDOMEN LIMITED LOCATION: [facility] DATE: 2/19/2025 INDICATION: pancreatitis COMPARISON: CT 02/19/2025 TECHNIQUE: Limited abdominal ultrasound. FINDINGS: GALLBLADDER: Cholelithiasis. No visible wall thickening. Sonographic Early's sign negative. Mild gallbladder distention. BILE DUCTS: Mild extra hepatic biliary dilatation. Common duct measures 8 mm. LIVER: Hepatic steatosis. RIGHT KIDNEY: No hydronephrosis. PANCREAS: Partial obscuration by bowel gas. Changes of pancreatitis better demonstrated on CT.     IMPRESSION: 1.  Cholelithiasis and mild gallbladder distention. 2.  Sonographic Early's sign negative. 3.  Changes of pancreatitis better demonstrated on CT.     XR Chest 1 View    Result Date: 2/19/2025  EXAM: XR CHEST 1 VIEW LOCATION: [facility] DATE: 2/19/2025 INDICATION: weak COMPARISON: None.     IMPRESSION: Negative chest.    CT Abdomen Pelvis w Contrast    Result Date: 2/19/2025  EXAM: CT ABDOMEN PELVIS W CONTRAST LOCATION: [facility] DATE: 2/19/2025 INDICATION: epigastric abd pain COMPARISON: None. TECHNIQUE: CT scan of the abdomen and pelvis was performed following injection of IV contrast. Multiplanar reformats were obtained. Dose reduction techniques were used. CONTRAST: isovue 370 90ml FINDINGS: LOWER CHEST: Normal. HEPATOBILIARY: Mild intrahepatic biliary dilation. Mild extrahepatic  "biliary dilation measuring non-10 mm. No calcified gallstones or biliary ductal stones. No suspicious liver lesion. PANCREAS: Moderate ascites in the upper abdomen centered around the pancreas. Mild pancreatic edema but homogeneous enhancement. No pancreatic mass or calcification. No ductal dilation. SPLEEN: Normal. ADRENAL GLANDS: Normal. KIDNEYS/BLADDER: Normal. BOWEL: Moderate upper abdominal ascites. Trace free fluid in the pelvis. No free air. Small bowel normal in caliber. No bowel wall thickening or abnormal enhancement. Normal caliber appendix. LYMPH NODES: Normal. VASCULATURE: Normal. PELVIC ORGANS: Normal. MUSCULOSKELETAL: Normal.     IMPRESSION: 1.  Acute (interstitial edematous) pancreatitis without evidence for necrosis. 2.  Moderate ascites. 3.  Mild intrahepatic and extrahepatic biliary dilation. No calcified gallstones are ductal stones. Consider follow-up right upper quadrant ultrasound for further evaluation.       Lab Results:  Personally Reviewed.   Fingerstick Blood Glucose: @[id number](POCGLUFGR:10)@    Last Hbg A1C: No results found for: \"HGBA1C\"   Lab Results   Component Value Date    INR 1.02 02/20/2025     Recent Results (from the past 24 hours)   Hemoglobin    Collection Time: 02/21/25 10:09 AM   Result Value Ref Range    Hemoglobin 12.8 (L) 13.3 - 17.7 g/dL   Phosphorus    Collection Time: 02/22/25  5:24 AM   Result Value Ref Range    Phosphorus 3.1 2.5 - 4.5 mg/dL   CBC with platelets    Collection Time: 02/22/25  5:24 AM   Result Value Ref Range    WBC Count 16.1 (H) 4.0 - 11.0 10e3/uL    RBC Count 4.25 (L) 4.40 - 5.90 10e6/uL    Hemoglobin 12.3 (L) 13.3 - 17.7 g/dL    Hematocrit 36.0 (L) 40.0 - 53.0 %    MCV 85 78 - 100 fL    MCH 28.9 26.5 - 33.0 pg    MCHC 34.2 31.5 - 36.5 g/dL    RDW 13.6 10.0 - 15.0 %    Platelet Count 287 150 - 450 10e3/uL   Comprehensive metabolic panel    Collection Time: 02/22/25  5:24 AM   Result Value Ref Range    Sodium 139 135 - 145 mmol/L    Potassium 3.9 " 3.4 - 5.3 mmol/L    Carbon Dioxide (CO2) 25 22 - 29 mmol/L    Anion Gap 11 7 - 15 mmol/L    Urea Nitrogen 8.1 6.0 - 20.0 mg/dL    Creatinine 0.59 (L) 0.67 - 1.17 mg/dL    GFR Estimate >90 >60 mL/min/1.73m2    Calcium 8.6 (L) 8.8 - 10.4 mg/dL    Chloride 103 98 - 107 mmol/L    Glucose 89 70 - 99 mg/dL    Alkaline Phosphatase 84 40 - 150 U/L    AST 22 0 - 45 U/L     (H) 0 - 70 U/L    Protein Total 6.3 (L) 6.4 - 8.3 g/dL    Albumin 3.4 (L) 3.5 - 5.2 g/dL    Bilirubin Total 0.7 <=1.2 mg/dL       Medically Ready for Discharge: Anticipated in 2-4 Days       Advanced Care Planning    Medical Decision Making       50 MINUTES SPENT BY ME on the date of service doing chart review, history, exam, documentation & further activities per the note.      Bernabe Salazar MD  Date: 2/22/2025  Time: 9:11 AM  [facility] Service  Family [facility]

## 2025-02-22 NOTE — PROVIDER NOTIFICATION
Notified MD that pt spiked a fever of 101.7 orally. Gave Ibuprofen for Migraine headache rated at a 7. Pt shallow breathing due to abdominal pain. Enc Incentive spirometer. Pt up walking x 2 to bathroom.

## 2025-02-22 NOTE — PROGRESS NOTES
Julio spiked a fever overnight with a temp of 101.7.  He has been off antibiotics since yesterday.  No antibiotics were reinitiated last night.  Will start Zosyn and consult ID.  Gallbladder ultrasound showed no gallbladder wall thickening consistent with cholecystitis, blood cultures positive for GPC thought to be contaminant.  Unsure exactly where the source is at this point.  White count still elevated .

## 2025-02-22 NOTE — PROVIDER NOTIFICATION
Pt C/O headache rated at a 4. Requesting Ibuprofen, unable to have tylenol due to elevated liver enzymes.

## 2025-02-23 ENCOUNTER — APPOINTMENT (OUTPATIENT)
Dept: RADIOLOGY | Facility: HOSPITAL | Age: 25
End: 2025-02-23
Attending: EMERGENCY MEDICINE
Payer: COMMERCIAL

## 2025-02-23 ENCOUNTER — APPOINTMENT (OUTPATIENT)
Dept: ULTRASOUND IMAGING | Facility: HOSPITAL | Age: 25
End: 2025-02-23
Attending: EMERGENCY MEDICINE

## 2025-02-23 LAB
ALBUMIN SERPL BCG-MCNC: 3.6 G/DL (ref 3.5–5.2)
ALP SERPL-CCNC: 86 U/L (ref 40–150)
ALT SERPL W P-5'-P-CCNC: 119 U/L (ref 0–70)
ANION GAP SERPL CALCULATED.3IONS-SCNC: 8 MMOL/L (ref 7–15)
AST SERPL W P-5'-P-CCNC: 17 U/L (ref 0–45)
BACTERIA BLD CULT: ABNORMAL
BACTERIA BLD CULT: ABNORMAL
BILIRUB SERPL-MCNC: 0.6 MG/DL
BUN SERPL-MCNC: 7.3 MG/DL (ref 6–20)
CALCIUM SERPL-MCNC: 9 MG/DL (ref 8.8–10.4)
CHLORIDE SERPL-SCNC: 101 MMOL/L (ref 98–107)
CREAT SERPL-MCNC: 0.62 MG/DL (ref 0.67–1.17)
EGFRCR SERPLBLD CKD-EPI 2021: >90 ML/MIN/1.73M2
ERYTHROCYTE [DISTWIDTH] IN BLOOD BY AUTOMATED COUNT: 13.2 % (ref 10–15)
FLUAV RNA SPEC QL NAA+PROBE: NEGATIVE
FLUBV RNA RESP QL NAA+PROBE: NEGATIVE
GLUCOSE SERPL-MCNC: 102 MG/DL (ref 70–99)
HCO3 SERPL-SCNC: 27 MMOL/L (ref 22–29)
HCT VFR BLD AUTO: 37.4 % (ref 40–53)
HGB BLD-MCNC: 12.8 G/DL (ref 13.3–17.7)
LIPASE SERPL-CCNC: 54 U/L (ref 13–60)
MCH RBC QN AUTO: 28.5 PG (ref 26.5–33)
MCHC RBC AUTO-ENTMCNC: 34.2 G/DL (ref 31.5–36.5)
MCV RBC AUTO: 83 FL (ref 78–100)
PHOSPHATE SERPL-MCNC: 3.4 MG/DL (ref 2.5–4.5)
PLATELET # BLD AUTO: 322 10E3/UL (ref 150–450)
POTASSIUM SERPL-SCNC: 3.8 MMOL/L (ref 3.4–5.3)
PROT SERPL-MCNC: 6.8 G/DL (ref 6.4–8.3)
RBC # BLD AUTO: 4.49 10E6/UL (ref 4.4–5.9)
RSV RNA SPEC NAA+PROBE: NEGATIVE
SARS-COV-2 RNA RESP QL NAA+PROBE: NEGATIVE
SODIUM SERPL-SCNC: 136 MMOL/L (ref 135–145)
WBC # BLD AUTO: 15.6 10E3/UL (ref 4–11)

## 2025-02-23 PROCEDURE — 258N000003 HC RX IP 258 OP 636: Performed by: EMERGENCY MEDICINE

## 2025-02-23 PROCEDURE — 83690 ASSAY OF LIPASE: CPT | Performed by: EMERGENCY MEDICINE

## 2025-02-23 PROCEDURE — 85048 AUTOMATED LEUKOCYTE COUNT: CPT | Performed by: EMERGENCY MEDICINE

## 2025-02-23 PROCEDURE — 84100 ASSAY OF PHOSPHORUS: CPT | Performed by: EMERGENCY MEDICINE

## 2025-02-23 PROCEDURE — 99233 SBSQ HOSP IP/OBS HIGH 50: CPT | Performed by: EMERGENCY MEDICINE

## 2025-02-23 PROCEDURE — 82374 ASSAY BLOOD CARBON DIOXIDE: CPT | Performed by: EMERGENCY MEDICINE

## 2025-02-23 PROCEDURE — 71045 X-RAY EXAM CHEST 1 VIEW: CPT

## 2025-02-23 PROCEDURE — 120N000001 HC R&B MED SURG/OB

## 2025-02-23 PROCEDURE — 85014 HEMATOCRIT: CPT | Performed by: EMERGENCY MEDICINE

## 2025-02-23 PROCEDURE — 250N000011 HC RX IP 250 OP 636: Performed by: EMERGENCY MEDICINE

## 2025-02-23 PROCEDURE — 87040 BLOOD CULTURE FOR BACTERIA: CPT | Performed by: EMERGENCY MEDICINE

## 2025-02-23 PROCEDURE — 82310 ASSAY OF CALCIUM: CPT | Performed by: EMERGENCY MEDICINE

## 2025-02-23 PROCEDURE — 87637 SARSCOV2&INF A&B&RSV AMP PRB: CPT | Performed by: EMERGENCY MEDICINE

## 2025-02-23 PROCEDURE — 76705 ECHO EXAM OF ABDOMEN: CPT

## 2025-02-23 PROCEDURE — 250N000013 HC RX MED GY IP 250 OP 250 PS 637: Performed by: INTERNAL MEDICINE

## 2025-02-23 PROCEDURE — 250N000009 HC RX 250: Performed by: INTERNAL MEDICINE

## 2025-02-23 PROCEDURE — 250N000013 HC RX MED GY IP 250 OP 250 PS 637: Performed by: EMERGENCY MEDICINE

## 2025-02-23 PROCEDURE — 36415 COLL VENOUS BLD VENIPUNCTURE: CPT | Performed by: EMERGENCY MEDICINE

## 2025-02-23 PROCEDURE — 250N000011 HC RX IP 250 OP 636: Performed by: INTERNAL MEDICINE

## 2025-02-23 RX ORDER — SODIUM CHLORIDE 9 MG/ML
INJECTION, SOLUTION INTRAVENOUS CONTINUOUS
Status: DISCONTINUED | OUTPATIENT
Start: 2025-02-23 | End: 2025-02-24

## 2025-02-23 RX ORDER — CEFEPIME HYDROCHLORIDE 2 G/1
2 INJECTION, POWDER, FOR SOLUTION INTRAVENOUS EVERY 8 HOURS
Status: DISCONTINUED | OUTPATIENT
Start: 2025-02-23 | End: 2025-02-25 | Stop reason: HOSPADM

## 2025-02-23 RX ORDER — VANCOMYCIN HYDROCHLORIDE 1 G/200ML
1000 INJECTION, SOLUTION INTRAVENOUS EVERY 8 HOURS
Status: DISCONTINUED | OUTPATIENT
Start: 2025-02-23 | End: 2025-02-25 | Stop reason: HOSPADM

## 2025-02-23 RX ADMIN — FOLIC ACID 1 MG: 5 INJECTION, SOLUTION INTRAMUSCULAR; INTRAVENOUS; SUBCUTANEOUS at 08:40

## 2025-02-23 RX ADMIN — OXYCODONE HYDROCHLORIDE 5 MG: 5 TABLET ORAL at 11:52

## 2025-02-23 RX ADMIN — IBUPROFEN 400 MG: 200 TABLET, FILM COATED ORAL at 11:52

## 2025-02-23 RX ADMIN — OXYCODONE HYDROCHLORIDE 5 MG: 5 TABLET ORAL at 01:25

## 2025-02-23 RX ADMIN — SODIUM CHLORIDE: 0.9 INJECTION, SOLUTION INTRAVENOUS at 10:06

## 2025-02-23 RX ADMIN — SODIUM CHLORIDE 1500 MG: 0.9 INJECTION, SOLUTION INTRAVENOUS at 16:57

## 2025-02-23 RX ADMIN — SODIUM CHLORIDE: 0.9 INJECTION, SOLUTION INTRAVENOUS at 18:15

## 2025-02-23 RX ADMIN — PIPERACILLIN AND TAZOBACTAM 3.38 G: 3; .375 INJECTION, POWDER, FOR SOLUTION INTRAVENOUS at 06:48

## 2025-02-23 RX ADMIN — PANTOPRAZOLE SODIUM 40 MG: 40 INJECTION, POWDER, FOR SOLUTION INTRAVENOUS at 08:40

## 2025-02-23 RX ADMIN — THIAMINE HYDROCHLORIDE 100 MG: 100 INJECTION, SOLUTION INTRAMUSCULAR; INTRAVENOUS at 08:40

## 2025-02-23 RX ADMIN — PIPERACILLIN AND TAZOBACTAM 3.38 G: 3; .375 INJECTION, POWDER, FOR SOLUTION INTRAVENOUS at 14:44

## 2025-02-23 RX ADMIN — CEFEPIME HYDROCHLORIDE 2 G: 2 INJECTION, POWDER, FOR SOLUTION INTRAVENOUS at 18:15

## 2025-02-23 RX ADMIN — SODIUM CHLORIDE 1000 ML: 0.9 INJECTION, SOLUTION INTRAVENOUS at 16:47

## 2025-02-23 RX ADMIN — VANCOMYCIN HYDROCHLORIDE 1000 MG: 1 INJECTION, SOLUTION INTRAVENOUS at 21:49

## 2025-02-23 ASSESSMENT — ACTIVITIES OF DAILY LIVING (ADL)
ADLS_ACUITY_SCORE: 32

## 2025-02-23 NOTE — PLAN OF CARE
"End of Shift Summary.  For vital signs and complete assessments, please see documentation flowsheets.      Pertinent assessments: VSS, Tmax 99.7, given oxy for pain x1 w/ relief. Zosyn q8hrs. Low fat diet, no appetite. Denies n/v.   Major Shift Events: none  Plan (Upcoming Events): monitor labs, monitor VS, [place] temp.  Discharge/Transfer Needs: unknown at this time          Problem: Adult Inpatient Plan of Care  Goal: Plan of Care Review  Description: The Plan of Care Review/Shift note should be completed every shift.  The Outcome Evaluation is a brief statement about your assessment that the patient is improving, declining, or no change.  This information will be displayed automatically on your shift  note.  Outcome: Progressing  Goal: Patient-Specific Goal (Individualized)  Description: You can add care plan individualizations to a care plan. Examples of Individualization might be:  \"Parent requests to be called daily at 9am for status\", \"I have a hard time hearing out of my right ear\", or \"Do not touch me to wake me up as it startles  me\".  Outcome: Progressing  Goal: Absence of Hospital-Acquired Illness or Injury  Outcome: Progressing  Intervention: Identify and Manage Fall Risk  Recent Flowsheet Documentation  Taken 2/22/2025 2020 by Regi Snell RN  Safety Promotion/Fall Prevention:   clutter free environment maintained   increased rounding and observation   lighting adjusted   nonskid shoes/slippers when out of bed   patient and family education   safety round/check completed  Intervention: Prevent Skin Injury  Recent Flowsheet Documentation  Taken 2/22/2025 2021 by Regi Snell, RN  Body Position: position maintained  Intervention: Prevent and Manage VTE (Venous Thromboembolism) Risk  Recent Flowsheet Documentation  Taken 2/22/2025 2020 by Regi Snell, RN  VTE Prevention/Management:   SCDs off (sequential compression devices)   patient refused intervention  Goal: Optimal Comfort and " Wellbeing  Outcome: Progressing  Goal: Readiness for Transition of Care  Outcome: Progressing     Problem: Pain Acute  Goal: Optimal Pain Control and Function  Outcome: Progressing  Intervention: Prevent or Manage Pain  Recent Flowsheet Documentation  Taken 2/22/2025 2020 by Regi Snell RN  Medication Review/Management: medications reviewed

## 2025-02-23 NOTE — PLAN OF CARE
Paged hospitalist on call:  pt's temp is 101.8 now, ibuprofen was given at 1810, he is already on zosyn, please advise. thanks! HERIBERTO Deluna    No new orders.

## 2025-02-23 NOTE — PROVIDER NOTIFICATION
Provider Dr. Geller notified 2/22/25 at 1818:    Pt would like to eat solid food this evening. Per GI note, can advance diet to low fat if pain is tolerable.     Also, FYI I will NS lock pt now that he is able to eat.     Provider response: new order placed.

## 2025-02-23 NOTE — PLAN OF CARE
Problem: Adult Inpatient Plan of Care  Goal: Optimal Comfort and Wellbeing  Intervention: Monitor Pain and Promote Comfort  Recent Flowsheet Documentation  Taken 2/23/2025 1152 by Rogerio Carrion, RN  Pain Management Interventions: medication (see MAR)  Taken 2/23/2025 0848 by Rogerio Carrion, RN  Pain Management Interventions: declines     Pt reports of cough with green sputum. Respiratory swab negative. CXR completed: left lower lobe pneumonia. Blood culture pending.  Gave a dose of vanco and cefepime this evening.    Today lipase 54 and .  US abd completed.    Phos 3.4, lab ordered for tomorrow AM.    Gave Ibuprofen x1 for temp 100.7 F.  Abd pain 4/10, relieved with PO oxycodone.     Had cereal and orange this morning and tolerated intake.    Showered.

## 2025-02-23 NOTE — PROGRESS NOTES
Daily Progress Note    Assessment/Plan:  24-year-old male admitted with acute gallstone pancreatitis with cholelithiasis and ascites and hepatic steatosis.  Underwent EUS on February 20 showing multiple stones visualized in the gallbladder body.  Gallbladder ultrasound on admission showed no evidence of cholecystitis.  Has now been spiking fevers for 2 days straight with continued intermittent abdominal pain yet does feel improved overall.  Today reports new cough and with continued fevers a chest x-ray was done showing a left lower lobe pneumonia.  Negative for COVID/influenza/RSV.  Technically this would be HCAP which has not responded to current dose of Zosyn.  Will consult ID.  In the meantime we will switch to cefepime and vancomycin.  Will also pursue right upper quadrant ultrasound to  look for development of cholecystitis.  Surgery following and planning for outpatient cholecystectomy.  GI signed off.     Hospital-acquired pneumonia:  Spiking fevers daily last 2 days, 101.8 last evening with continued low-grade temp. White count has trended down to 15.6 from 25.5 on admission..  Blood cultures added.  Will switch from Zosyn 3.125 dosage to cefepime and vancomycin , I spoke with ID and they will see him tomorrow.  He is not hypoxic.  Right upper quadrant ultrasound ordered as above to rule out new appearance of cholecystitis.  Acute gallstone pancreatitis: Technically this could also be causing fever but with a normalized lipase I think this would be less likely.  If fevers continue with no evidence of cholecystitis I recommend repeat abdominal CT to reevaluate the pancreas.  His lipase which was greater than 3000 on admission and now at 54 today-I question if his continued pain is biliary colic versus actual pancreatitis.  Currently on full liquids which he appears to be tolerating, defer advancement to surgery.  Will continue aggressive IV fluids.  Gallstones: Surgery planning for outpatient  "cholecystectomy, ruling out cholecystitis as above.  Bacteremia: Blood cultures positive for GPC which is likely contaminant.  Vancomycin stopped on February 21, but now restarted as above.  Clinically Significant Risk Factors           # Hypocalcemia: Lowest Ca = 8.6 mg/dL in last 2 days, will monitor and replace as appropriate     # Hypoalbuminemia: Lowest albumin = 3.3 g/dL at 2/21/2025  5:35 AM, will monitor as appropriate                # Overweight: Estimated body mass index is 26.37 kg/m  as calculated from the following:    Height as of this encounter: 1.727 m (5' 8\").    Weight as of this encounter: 78.7 kg (173 lb 6.4 oz)., PRESENT ON ADMISSION               Code status:Full Code        Barriers to Discharge: New pneumonia, persistent fevers, IV antibiotics    Disposition: Anticipate discharge in 2 days     Subjective:  Julio continues to have fever spikes and intermittent abdominal pain.  Today he is complaining of a new cough.  There are no modifying factors regarding his pain, eating does not seem to change it 1 way or the other.  He has tolerated his diet thus far but states that he eats very slowly.  No chest pain, no diarrhea, no new skin rash.  Despite this he continues to feel a little bit better each day.        Current Medications Reviewed via EHR List    Objective:  Vital signs in last 24 hours:  [unfilled]  .prog  Weight:   @THISENCWEIGHTS(1)@  Weight change:   Body mass index is 26.37 kg/m .    Intake/Output last 3 shifts:  I/O last 3 completed shifts:  In: 1220 [P.O.:1220]  Out: 1150 [Urine:1150]  Intake/Output this shift:  No intake/output data recorded.    Physical Exam:  General: No acute distress, pleasant and talkative, sitting on the side of the bed  CV: Regular rate and rhythm  Lungs: Clear to auscultation, right upper quadrant pain with deep breathing  Abdomen: Right upper quadrant/epigastric tenderness        Imaging:  Personally Reviewed.  US Abdomen Limited    Result Date: " 2/19/2025  EXAM: US ABDOMEN LIMITED LOCATION: [facility] DATE: 2/19/2025 INDICATION: pancreatitis COMPARISON: CT 02/19/2025 TECHNIQUE: Limited abdominal ultrasound. FINDINGS: GALLBLADDER: Cholelithiasis. No visible wall thickening. Sonographic Early's sign negative. Mild gallbladder distention. BILE DUCTS: Mild extra hepatic biliary dilatation. Common duct measures 8 mm. LIVER: Hepatic steatosis. RIGHT KIDNEY: No hydronephrosis. PANCREAS: Partial obscuration by bowel gas. Changes of pancreatitis better demonstrated on CT.     IMPRESSION: 1.  Cholelithiasis and mild gallbladder distention. 2.  Sonographic Early's sign negative. 3.  Changes of pancreatitis better demonstrated on CT.     XR Chest 1 View    Result Date: 2/19/2025  EXAM: XR CHEST 1 VIEW LOCATION: [facility] DATE: 2/19/2025 INDICATION: weak COMPARISON: None.     IMPRESSION: Negative chest.    CT Abdomen Pelvis w Contrast    Result Date: 2/19/2025  EXAM: CT ABDOMEN PELVIS W CONTRAST LOCATION: [facility] DATE: 2/19/2025 INDICATION: epigastric abd pain COMPARISON: None. TECHNIQUE: CT scan of the abdomen and pelvis was performed following injection of IV contrast. Multiplanar reformats were obtained. Dose reduction techniques were used. CONTRAST: isovue 370 90ml FINDINGS: LOWER CHEST: Normal. HEPATOBILIARY: Mild intrahepatic biliary dilation. Mild extrahepatic biliary dilation measuring non-10 mm. No calcified gallstones or biliary ductal stones. No suspicious liver lesion. PANCREAS: Moderate ascites in the upper abdomen centered around the pancreas. Mild pancreatic edema but homogeneous enhancement. No pancreatic mass or calcification. No ductal dilation. SPLEEN: Normal. ADRENAL GLANDS: Normal. KIDNEYS/BLADDER: Normal. BOWEL: Moderate upper abdominal ascites. Trace free fluid in the pelvis. No free air. Small bowel normal in caliber. No bowel wall thickening or abnormal enhancement.  "Normal caliber appendix. LYMPH NODES: Normal. VASCULATURE: Normal. PELVIC ORGANS: Normal. MUSCULOSKELETAL: Normal.     IMPRESSION: 1.  Acute (interstitial edematous) pancreatitis without evidence for necrosis. 2.  Moderate ascites. 3.  Mild intrahepatic and extrahepatic biliary dilation. No calcified gallstones are ductal stones. Consider follow-up right upper quadrant ultrasound for further evaluation.       Lab Results:  Personally Reviewed.   Fingerstick Blood Glucose: @[id number](POCGLUFGR:10)@    Last Hbg A1C: No results found for: \"HGBA1C\"   Lab Results   Component Value Date    INR 1.02 02/20/2025     Recent Results (from the past 24 hours)   Phosphorus    Collection Time: 02/23/25  6:19 AM   Result Value Ref Range    Phosphorus 3.4 2.5 - 4.5 mg/dL   Comprehensive metabolic panel    Collection Time: 02/23/25  6:19 AM   Result Value Ref Range    Sodium 136 135 - 145 mmol/L    Potassium 3.8 3.4 - 5.3 mmol/L    Carbon Dioxide (CO2) 27 22 - 29 mmol/L    Anion Gap 8 7 - 15 mmol/L    Urea Nitrogen 7.3 6.0 - 20.0 mg/dL    Creatinine 0.62 (L) 0.67 - 1.17 mg/dL    GFR Estimate >90 >60 mL/min/1.73m2    Calcium 9.0 8.8 - 10.4 mg/dL    Chloride 101 98 - 107 mmol/L    Glucose 102 (H) 70 - 99 mg/dL    Alkaline Phosphatase 86 40 - 150 U/L    AST 17 0 - 45 U/L     (H) 0 - 70 U/L    Protein Total 6.8 6.4 - 8.3 g/dL    Albumin 3.6 3.5 - 5.2 g/dL    Bilirubin Total 0.6 <=1.2 mg/dL   Lipase    Collection Time: 02/23/25  6:19 AM   Result Value Ref Range    Lipase 54 13 - 60 U/L   CBC with platelets    Collection Time: 02/23/25  6:52 AM   Result Value Ref Range    WBC Count 15.6 (H) 4.0 - 11.0 10e3/uL    RBC Count 4.49 4.40 - 5.90 10e6/uL    Hemoglobin 12.8 (L) 13.3 - 17.7 g/dL    Hematocrit 37.4 (L) 40.0 - 53.0 %    MCV 83 78 - 100 fL    MCH 28.5 26.5 - 33.0 pg    MCHC 34.2 31.5 - 36.5 g/dL    RDW 13.2 10.0 - 15.0 %    Platelet Count 322 150 - 450 10e3/uL       Medically Ready for Discharge: Anticipated in 2-4 Days   "     Advanced Care Planning    Medical Decision Making       50 MINUTES SPENT BY ME on the date of service doing chart review, history, exam, documentation & further activities per the note.      Bernabe Salazar MD  Date: 2/23/2025  Time: 9:44 AM  [facility]  Family Medicine

## 2025-02-23 NOTE — PHARMACY-VANCOMYCIN DOSING SERVICE
"Pharmacy Vancomycin Initial Note  Date of Service 2025  Patient's  2000  24 year old, male    Indication: Healthcare-Associated Pneumonia    Current estimated CrCl = Estimated Creatinine Clearance: 206.3 mL/min (A) (based on SCr of 0.62 mg/dL (L)).    Creatinine for last 3 days  2025:  5:35 AM Creatinine 0.61 mg/dL  2025:  5:24 AM Creatinine 0.59 mg/dL  2025:  6:19 AM Creatinine 0.62 mg/dL    Recent Vancomycin Level(s) for last 3 days  No results found for requested labs within last 3 days.      Vancomycin IV Administrations (past 72 hours)                     vancomycin (VANCOCIN) 1,000 mg in 200 mL dextrose intermittent infusion (mg) 1,000 mg New Bag 25 0030     1,000 mg New Bag 25 1745                    Nephrotoxins and other renal medications (From now, onward)      Start     Dose/Rate Route Frequency Ordered Stop    25 2200  vancomycin (VANCOCIN) 1,000 mg in 200 mL dextrose intermittent infusion         1,000 mg  200 mL/hr over 1 Hours Intravenous EVERY 8 HOURS 25 1556      25 1600  vancomycin (VANCOCIN) 1,500 mg in 0.9% NaCl 265 mL intermittent infusion         1,500 mg  over 90 Minutes Intravenous ONCE 25 1556      25 1400  piperacillin-tazobactam (ZOSYN) 3.375 g vial to attach to  mL bag        Note to Pharmacy: For [facility], [facility] and [facility]: For Zosyn-naive patients, use the \"Zosyn initial dose + extended infusion\" order panel.    3.375 g  over 240 Minutes Intravenous EVERY 8 HOURS 25 0729      25 1816  ibuprofen (ADVIL/MOTRIN) tablet 400 mg         400 mg Oral EVERY 4 HOURS PRN 25 1816              Contrast Orders - past 72 hours (72h ago, onward)      None            [employer] Prediction of Planned Initial Vancomycin Regimen  Regimen: 1000 mg IV every 8 hours.  Start time: 15:51 on 2025  Exposure target: AUC24 (range)400-600 mg/L.hr   AUC24,ss: 552 mg/L.hr  Probability of AUC24 > 400: 80 %  Ctrough,ss: 17.6 " mg/L  Probability of Ctrough,ss > 20: 40 %  Probability of nephrotoxicity (Lodise CARMELA 2009): 13 %          Plan:  Start vancomycin  1500 mg IV x1, then 1000 mg IV q8h.   Vancomycin monitoring method: AUC  Vancomycin therapeutic monitoring goal: 400-600 mg*h/L  Pharmacy will check vancomycin levels as appropriate in 1-3 Days.    Serum creatinine levels will be ordered daily for the first week of therapy and at least twice weekly for subsequent weeks.      Moni Gee, [facility]

## 2025-02-23 NOTE — PROVIDER NOTIFICATION
Dr. Salazar notified via [employer] text page 2/23/25 at 1106:    FYI- covid/flu/resp virus negative. I am going to discontinue his special precaution isolation.

## 2025-02-24 LAB
ALBUMIN SERPL BCG-MCNC: 3.5 G/DL (ref 3.5–5.2)
ALP SERPL-CCNC: 77 U/L (ref 40–150)
ALT SERPL W P-5'-P-CCNC: 89 U/L (ref 0–70)
ANION GAP SERPL CALCULATED.3IONS-SCNC: 11 MMOL/L (ref 7–15)
AST SERPL W P-5'-P-CCNC: 17 U/L (ref 0–45)
BACTERIA BLD CULT: NO GROWTH
BILIRUB SERPL-MCNC: 0.6 MG/DL
BUN SERPL-MCNC: 8 MG/DL (ref 6–20)
CALCIUM SERPL-MCNC: 9 MG/DL (ref 8.8–10.4)
CHLORIDE SERPL-SCNC: 101 MMOL/L (ref 98–107)
CREAT SERPL-MCNC: 0.62 MG/DL (ref 0.67–1.17)
EGFRCR SERPLBLD CKD-EPI 2021: >90 ML/MIN/1.73M2
ERYTHROCYTE [DISTWIDTH] IN BLOOD BY AUTOMATED COUNT: 13 % (ref 10–15)
GLUCOSE SERPL-MCNC: 94 MG/DL (ref 70–99)
HCO3 SERPL-SCNC: 24 MMOL/L (ref 22–29)
HCT VFR BLD AUTO: 37 % (ref 40–53)
HGB BLD-MCNC: 12.2 G/DL (ref 13.3–17.7)
MCH RBC QN AUTO: 27.9 PG (ref 26.5–33)
MCHC RBC AUTO-ENTMCNC: 33 G/DL (ref 31.5–36.5)
MCV RBC AUTO: 85 FL (ref 78–100)
PHOSPHATE SERPL-MCNC: 4.3 MG/DL (ref 2.5–4.5)
PLATELET # BLD AUTO: 349 10E3/UL (ref 150–450)
POTASSIUM SERPL-SCNC: 3.8 MMOL/L (ref 3.4–5.3)
PROT SERPL-MCNC: 6.7 G/DL (ref 6.4–8.3)
RBC # BLD AUTO: 4.38 10E6/UL (ref 4.4–5.9)
SODIUM SERPL-SCNC: 136 MMOL/L (ref 135–145)
WBC # BLD AUTO: 12.7 10E3/UL (ref 4–11)

## 2025-02-24 PROCEDURE — 82310 ASSAY OF CALCIUM: CPT | Performed by: EMERGENCY MEDICINE

## 2025-02-24 PROCEDURE — 250N000013 HC RX MED GY IP 250 OP 250 PS 637: Performed by: EMERGENCY MEDICINE

## 2025-02-24 PROCEDURE — 258N000003 HC RX IP 258 OP 636: Performed by: FAMILY MEDICINE

## 2025-02-24 PROCEDURE — 250N000011 HC RX IP 250 OP 636: Performed by: EMERGENCY MEDICINE

## 2025-02-24 PROCEDURE — 85018 HEMOGLOBIN: CPT | Performed by: EMERGENCY MEDICINE

## 2025-02-24 PROCEDURE — 120N000001 HC R&B MED SURG/OB

## 2025-02-24 PROCEDURE — 99254 IP/OBS CNSLTJ NEW/EST MOD 60: CPT | Performed by: INTERNAL MEDICINE

## 2025-02-24 PROCEDURE — 84100 ASSAY OF PHOSPHORUS: CPT | Performed by: EMERGENCY MEDICINE

## 2025-02-24 PROCEDURE — 250N000013 HC RX MED GY IP 250 OP 250 PS 637: Performed by: FAMILY MEDICINE

## 2025-02-24 PROCEDURE — 36415 COLL VENOUS BLD VENIPUNCTURE: CPT | Performed by: EMERGENCY MEDICINE

## 2025-02-24 PROCEDURE — 250N000011 HC RX IP 250 OP 636: Performed by: INTERNAL MEDICINE

## 2025-02-24 PROCEDURE — 250N000009 HC RX 250: Performed by: INTERNAL MEDICINE

## 2025-02-24 PROCEDURE — 250N000013 HC RX MED GY IP 250 OP 250 PS 637: Performed by: INTERNAL MEDICINE

## 2025-02-24 PROCEDURE — 85048 AUTOMATED LEUKOCYTE COUNT: CPT | Performed by: EMERGENCY MEDICINE

## 2025-02-24 PROCEDURE — 99232 SBSQ HOSP IP/OBS MODERATE 35: CPT | Performed by: FAMILY MEDICINE

## 2025-02-24 RX ORDER — GUAIFENESIN/DEXTROMETHORPHAN 100-10MG/5
10 SYRUP ORAL EVERY 4 HOURS PRN
Status: DISCONTINUED | OUTPATIENT
Start: 2025-02-24 | End: 2025-02-25 | Stop reason: HOSPADM

## 2025-02-24 RX ORDER — SODIUM CHLORIDE 9 MG/ML
INJECTION, SOLUTION INTRAVENOUS CONTINUOUS
Status: ACTIVE | OUTPATIENT
Start: 2025-02-24 | End: 2025-02-25

## 2025-02-24 RX ADMIN — CEFEPIME HYDROCHLORIDE 2 G: 2 INJECTION, POWDER, FOR SOLUTION INTRAVENOUS at 02:30

## 2025-02-24 RX ADMIN — GUAIFENESIN AND DEXTROMETHORPHAN 10 ML: 100; 10 SYRUP ORAL at 18:31

## 2025-02-24 RX ADMIN — OXYCODONE HYDROCHLORIDE 5 MG: 5 TABLET ORAL at 02:30

## 2025-02-24 RX ADMIN — OXYCODONE HYDROCHLORIDE 5 MG: 5 TABLET ORAL at 16:23

## 2025-02-24 RX ADMIN — IBUPROFEN 400 MG: 200 TABLET, FILM COATED ORAL at 13:35

## 2025-02-24 RX ADMIN — FOLIC ACID 1 MG: 5 INJECTION, SOLUTION INTRAMUSCULAR; INTRAVENOUS; SUBCUTANEOUS at 08:23

## 2025-02-24 RX ADMIN — CEFEPIME HYDROCHLORIDE 2 G: 2 INJECTION, POWDER, FOR SOLUTION INTRAVENOUS at 18:32

## 2025-02-24 RX ADMIN — PANTOPRAZOLE SODIUM 40 MG: 40 INJECTION, POWDER, FOR SOLUTION INTRAVENOUS at 08:20

## 2025-02-24 RX ADMIN — VANCOMYCIN HYDROCHLORIDE 1000 MG: 1 INJECTION, SOLUTION INTRAVENOUS at 13:39

## 2025-02-24 RX ADMIN — VANCOMYCIN HYDROCHLORIDE 1000 MG: 1 INJECTION, SOLUTION INTRAVENOUS at 06:04

## 2025-02-24 RX ADMIN — THIAMINE HYDROCHLORIDE 100 MG: 100 INJECTION, SOLUTION INTRAMUSCULAR; INTRAVENOUS at 08:23

## 2025-02-24 RX ADMIN — CEFEPIME HYDROCHLORIDE 2 G: 2 INJECTION, POWDER, FOR SOLUTION INTRAVENOUS at 09:10

## 2025-02-24 RX ADMIN — IBUPROFEN 400 MG: 200 TABLET, FILM COATED ORAL at 02:30

## 2025-02-24 RX ADMIN — SODIUM CHLORIDE: 0.9 INJECTION, SOLUTION INTRAVENOUS at 14:58

## 2025-02-24 RX ADMIN — VANCOMYCIN HYDROCHLORIDE 1000 MG: 1 INJECTION, SOLUTION INTRAVENOUS at 21:04

## 2025-02-24 ASSESSMENT — ACTIVITIES OF DAILY LIVING (ADL)
ADLS_ACUITY_SCORE: 32

## 2025-02-24 NOTE — CARE PLAN
PRIMARY Concern: gallstones and pneumonia   SAFETY RISK Concerns (fall risk, behaviors, etc.): fall risk     Isolation/Type: none  Tests/Procedures for NEXT shift: none  Consults? (Pending/following, signed-off?) GI, ID  Where is patient from? (Home, TCU, etc.): home  Other Important info for NEXT shift: intermittent fever  Anticipated DC date & active delays: TBD  _________________________________________________  Orientation/Cognitive: A&Ox4  Observation Goals (Met/ Not Met): not met  Mobility Level/Assist Equipment: indpt  Antibiotics & Plan (IV/po, length of tx left): IV abx  Pain Management: advil PO  Tele/VS/O2: Temp 99.2, other VSS, RA  ABNL Lab/BG: ALT 89, WBC 12.7  Diet: regular  Bowel/Bladder: continent  Skin Concerns: none  Drains/Devices: left PIV infusing

## 2025-02-24 NOTE — PLAN OF CARE
Goal Outcome Evaluation:      Plan of Care Reviewed With: patient, family    Overall Patient Progress: improvingOverall Patient Progress: improving     Pt is A&Ox4, denies N/V/D, SOB and dizziness. Mild temp at 99.2, advil PO given. Family bedside, pt able to make needs known, independent/continent. Using bedside urinal, voided approx 850 mL today. Left PIV infusing Vanco, heat pad on abdomen for pain control.

## 2025-02-24 NOTE — PLAN OF CARE
"End of Shift Summary.  For vital signs and complete assessments, please see documentation flowsheets.      Pertinent assessments: pt c/o mild abd cramping, received oxy & ibuprofen for pain & temp. Tmax this shift 101.7, temp decreased after ibuprofen. Pt encouraged to TCDB & use IS, does have a cough w/ occassional productive thick green sputum (per pt report). Pt can pull 1500 on IS, but has a hard time d/t abd pain when he does IS. Educated on importance of pulm toileting. Pt is on IV vanco & cefipime for PNA.     Major Shift Events: none  Plan (Upcoming Events): monitor labs, monitor VS, [place] temp.  Discharge/Transfer Needs: unknown at this time       Problem: Adult Inpatient Plan of Care  Goal: Plan of Care Review  Description: The Plan of Care Review/Shift note should be completed every shift.  The Outcome Evaluation is a brief statement about your assessment that the patient is improving, declining, or no change.  This information will be displayed automatically on your shift  note.  2/24/2025 0509 by Regi Snell, RN  Outcome: Progressing  2/24/2025 0508 by Regi Snell, RN  Outcome: Not Progressing  Goal: Patient-Specific Goal (Individualized)  Description: You can add care plan individualizations to a care plan. Examples of Individualization might be:  \"Parent requests to be called daily at 9am for status\", \"I have a hard time hearing out of my right ear\", or \"Do not touch me to wake me up as it startles  me\".  2/24/2025 0509 by Regi Snell, RN  Outcome: Progressing  2/24/2025 0508 by Regi Snell, RN  Outcome: Not Progressing  Goal: Absence of Hospital-Acquired Illness or Injury  2/24/2025 0509 by Regi Snell, RN  Outcome: Progressing  2/24/2025 0508 by Regi Snell, RN  Outcome: Not Progressing  Intervention: Identify and Manage Fall Risk  Recent Flowsheet Documentation  Taken 2/23/2025 2150 by Regi Snell, RN  Safety Promotion/Fall Prevention:   clutter free environment " maintained   increased rounding and observation   lighting adjusted   nonskid shoes/slippers when out of bed   patient and family education   safety round/check completed  Intervention: Prevent Skin Injury  Recent Flowsheet Documentation  Taken 2/23/2025 2152 by Regi Snell RN  Body Position: position changed independently  Intervention: Prevent and Manage VTE (Venous Thromboembolism) Risk  Recent Flowsheet Documentation  Taken 2/23/2025 2150 by Regi Snell RN  VTE Prevention/Management:   SCDs off (sequential compression devices)   patient refused intervention  Goal: Optimal Comfort and Wellbeing  2/24/2025 0509 by Regi Snell RN  Outcome: Progressing  2/24/2025 0508 by Regi Snell RN  Outcome: Not Progressing  Intervention: Monitor Pain and Promote Comfort  Recent Flowsheet Documentation  Taken 2/23/2025 2152 by Regi Snell RN  Pain Management Interventions: declines  Goal: Readiness for Transition of Care  2/24/2025 0509 by Regi Snell RN  Outcome: Progressing  2/24/2025 0508 by Regi Snell RN  Outcome: Not Progressing     Problem: Pain Acute  Goal: Optimal Pain Control and Function  2/24/2025 0509 by Regi Snell RN  Outcome: Progressing  2/24/2025 0508 by Regi Snell RN  Outcome: Not Progressing  Intervention: Develop Pain Management Plan  Recent Flowsheet Documentation  Taken 2/23/2025 2152 by Regi Snell RN  Pain Management Interventions: declines  Intervention: Prevent or Manage Pain  Recent Flowsheet Documentation  Taken 2/23/2025 2150 by Regi Snell RN  Medication Review/Management: medications reviewed

## 2025-02-24 NOTE — CONSULTS
"Infectious Disease Consultation:  Requesting MD:  Reason for consult:      HISTORY:  Young male originally admitted with gallstone pancreatitis who continued to have fever despite zosyn.  White count has nicely dropped and his midline gut pain is almost gone.  May have HCAP, and is on abx for this now:            Pertinent past history, past infectious disease history:    PAST MEDICAL HISTORY:  Past Medical History   History reviewed. No pertinent past medical history.        PAST SURGICAL HISTORY:  Past Surgical History   History reviewed. No pertinent surgical history.        CURRENT MEDICATIONS:    Prior to Admission Medications   Prescriptions Last Dose Informant Patient Reported? Taking?   ibuprofen (ADVIL/MOTRIN) 200 MG tablet     Yes Yes   Sig: Take 800 mg by mouth every 8 hours as needed for pain.   omeprazole (PRILOSEC) 20 MG DR capsule 2/19/2025 at  7:30 AM   No Yes   Sig: Take 1 capsule (20 mg) by mouth daily.   sucralfate (CARAFATE) 1 GM tablet 2/19/2025 Morning   Yes Yes   Sig: Take 1 g by mouth 2 times daily.      Facility-Administered Medications: None         ALLERGIES:  Allergies   No Known Allergies        FAMILY HISTORY:  Family History   History reviewed. No pertinent family history.        SOCIAL HISTORY:  Social History              Tobacco Use    Smoking status: Never    Smokeless tobacco: Former   Vaping Use    Vaping status: Former           Medications:  Reviewed prior to admission meds as applicable in chart review.  Current meds are reviewed in the EMR listed MAR.     ANTIBIOTICS:    Current:   Prior:   Allergy to:    SH/FH and  travel history(if applicable to consult):    REVIEW OF SYSTEMS:  All other systems negative    EXAMINATION:  /58 (BP Location: Left arm)   Pulse 60   Temp 98.5  F (36.9  C) (Oral)   Resp 16   Ht 1.727 m (5' 8\")   Wt 75.2 kg (165 lb 12.6 oz)   SpO2 97%   BMI 25.21 kg/m    Alert, awake  Vitals tabulated above, reviewed  HEENT:nl  Neck supple without " "lymphadenopathy  Sclera clear  CARDIOVASCULAR regular rate and rhythm, no murmur  Lungs CLEAR TO AUSCULTATION maybe a few rales LLL  Abdomen soft, NT/ND, absent HEPATOSPLENOMEGALY  Skin normal  Joints normal  Neurologic exam non focal  Wound:  NA        CLINICAL DATABASE FOR---LAB/MICRO/CULTURES/IMAGING STUDIES:  Lab Results   Component Value Date    WBC 12.7 02/24/2025     Lab Results   Component Value Date    RBC 4.38 02/24/2025     Lab Results   Component Value Date    HGB 12.2 02/24/2025     Lab Results   Component Value Date    HCT 37.0 02/24/2025     No components found for: \"MCT\"  Lab Results   Component Value Date    MCV 85 02/24/2025     Lab Results   Component Value Date    MCH 27.9 02/24/2025     Lab Results   Component Value Date    MCHC 33.0 02/24/2025     Lab Results   Component Value Date    RDW 13.0 02/24/2025     Lab Results   Component Value Date     02/24/2025       Last Comprehensive Metabolic Panel:  Sodium   Date Value Ref Range Status   02/24/2025 136 135 - 145 mmol/L Final     Potassium   Date Value Ref Range Status   02/24/2025 3.8 3.4 - 5.3 mmol/L Final     Chloride   Date Value Ref Range Status   02/24/2025 101 98 - 107 mmol/L Final     Carbon Dioxide (CO2)   Date Value Ref Range Status   02/24/2025 24 22 - 29 mmol/L Final     Anion Gap   Date Value Ref Range Status   02/24/2025 11 7 - 15 mmol/L Final     Glucose   Date Value Ref Range Status   02/24/2025 94 70 - 99 mg/dL Final     GLUCOSE BY METER POCT   Date Value Ref Range Status   02/20/2025 112 (H) 70 - 99 mg/dL Final     Urea Nitrogen   Date Value Ref Range Status   02/24/2025 8.0 6.0 - 20.0 mg/dL Final     Creatinine   Date Value Ref Range Status   02/24/2025 0.62 (L) 0.67 - 1.17 mg/dL Final     GFR Estimate   Date Value Ref Range Status   02/24/2025 >90 >60 mL/min/1.73m2 Final     Comment:     eGFR calculated using 2021 CKD-EPI equation.     Calcium   Date Value Ref Range Status   02/24/2025 9.0 8.8 - 10.4 mg/dL Final " "      Liver Function Studies -   Recent Labs   Lab Test 02/24/25  0535   PROTTOTAL 6.7   ALBUMIN 3.5   BILITOTAL 0.6   ALKPHOS 77   AST 17   ALT 89*       No results found for: \"SED\"                IMPRESSION:  Pancreatitis  Mild LLL PNA    PLAN:  Same  Albeit for fever, everything else going the right way  Ideally oral and home tomorrow      LOIDA KHAN MD  [facility]  Office [phone]      "

## 2025-02-24 NOTE — PROGRESS NOTES
[facility]    Medicine Progress Note - Hospitalist Service    Date of Admission:  2/19/2025    Assessment & Plan      24-year-old male admitted with acute gallstone pancreatitis with cholelithiasis and ascites and hepatic steatosis.  US with stones and ultrasound no evidence cholecystitis.  Hospitalization prolonged by fevers with chest x-ray showing left lower lobe pneumonia. .  Will also pursue right upper quadrant ultrasound to  look for development of cholecystitis.  Surgery following and planning for outpatient cholecystectomy.  GI signed off.     LLLCAP  ---concerning as was on zosyn since admit  ---had work up due to  fevers daily last 2-3 days,   ---. White count has trended down to 15.6 from 25.5 on admission..    ---Blood cultures per below  ---2/23 did switch from Zosyn 3.125 dosage to cefepime and vancomycin   ---Appreicate ID.    ---not hypoxic. .    Acute gallstone pancreatitis:   --- GI suspects passed CBD stone   --- Symptoms improved   ---repeat US with stable extrahepatic biliary dilatation, no cholecystitis  ---this could also be causing fever however appears less likely   ---Underwent EUS on February 20 showing multiple stones visualized in the gallbladder body, no choledocholithiasis  ---Gallbladder ultrasound on admission showed no evidence of cholecystitis.   --- lipase which was greater than 3000 on admission and now at 54 today  ---tolerating low fat diet since Saturday  ---surgery last saw 2/22 and planning op cholecystectomy    3. Right pleural effusion  ---suspect related to gallstone pancreatitis  ---monitor on same abx for now     Bacteremia:   ----Blood cultures positive for Strep Salivarus which is likely contaminant.  ---Vancomycin stopped on February 21, but now restarted as above.  --ID involved        Diet: Low Fat Diet (up to 50g)    DVT Prophylaxis: Ambulate every shift  Shanks Catheter: Not present  Lines: None     Cardiac Monitoring: None  Code Status:  "Full Code      Clinically Significant Risk Factors               # Hypoalbuminemia: Lowest albumin = 3.3 g/dL at 2/21/2025  5:35 AM, will monitor as appropriate                # Overweight: Estimated body mass index is 25.21 kg/m  as calculated from the following:    Height as of this encounter: 1.727 m (5' 8\").    Weight as of this encounter: 75.2 kg (165 lb 12.6 oz).             Social Drivers of Health    Tobacco Use: Medium Risk (2/20/2025)    Patient History     Smoking Tobacco Use: Never     Smokeless Tobacco Use: Former          Disposition Plan     Medically Ready for Discharge: Anticipated Tomorrow             Rajwinder Majano MD  Hospitalist Service  [facility]  Securely message with [employer] (more info)  Text page via [employer] Paging/Directory   ______________________________________________________________________    Interval History   ---cough with green sputum improved  ---feeling abdominal pain is some better - less pain with inspiration - still in epigastrium  ---Spiked temp to 101.7    Physical Exam   Vital Signs: Temp: 98.5  F (36.9  C) Temp src: Oral BP: 109/58 Pulse: 60   Resp: 16 SpO2: 97 % O2 Device: None (Room air)    Weight: 165 lbs 12.57 oz    General Appearance: Pleasant male in NAD  Respiratory: CTA-B  Cardiovascular: rrr, no murmers  GI: soft, epigastric mild tenderness without rebound or guarding or masses  Skin: no significant LE edema  Other: Neuro grossly intact     Medical Decision Making             Data     I have personally reviewed the following data over the past 24 hrs:    12.7 (H)  \   12.2 (L)   / 349     136 101 8.0 /  94   3.8 24 0.62 (L) \     ALT: 89 (H) AST: 17 AP: 77 TBILI: 0.6   ALB: 3.5 TOT PROTEIN: 6.7 LIPASE: N/A       Imaging results reviewed over the past 24 hrs:   Recent Results (from the past 24 hours)   US Abdomen Limited    Narrative    EXAM: US ABDOMEN LIMITED  LOCATION: [facility]  DATE: 2/23/2025    INDICATION: Midline " abdominal pain.  COMPARISON: 2/19/2025  TECHNIQUE: Limited abdominal ultrasound.    FINDINGS:    GALLBLADDER: Shadowing gallstones. No gallbladder wall thickening or pericholecystic fluid. Negative sonographic Early's sign.    BILE DUCTS: The common duct measures 8 mm. No intrahepatic biliary dilatation identified.    LIVER: Normal parenchyma with smooth contour. No focal mass. The portal vein is patent with flow in the normal direction.    RIGHT KIDNEY: No hydronephrosis.    PANCREAS: The visualized portions are normal.    No ascites.    Incidental note of right pleural effusion.      Impression    IMPRESSION:  1.  Cholelithiasis. No evidence of acute cholecystitis.    2.  Redemonstrated extrahepatic biliary dilatation. If clinical concern for choledocholithiasis, consider MRCP.    3.  Incidental note of right pleural effusion.

## 2025-02-25 VITALS
WEIGHT: 165.79 LBS | TEMPERATURE: 98.9 F | RESPIRATION RATE: 17 BRPM | DIASTOLIC BLOOD PRESSURE: 71 MMHG | OXYGEN SATURATION: 96 % | HEIGHT: 68 IN | HEART RATE: 82 BPM | SYSTOLIC BLOOD PRESSURE: 120 MMHG | BODY MASS INDEX: 25.13 KG/M2

## 2025-02-25 PROBLEM — R18.8 OTHER ASCITES: Status: RESOLVED | Noted: 2025-02-19 | Resolved: 2025-02-25

## 2025-02-25 PROBLEM — R10.13 ABDOMINAL PAIN, EPIGASTRIC: Status: RESOLVED | Noted: 2025-02-19 | Resolved: 2025-02-25

## 2025-02-25 PROBLEM — R79.89 ABNORMAL LFTS: Status: RESOLVED | Noted: 2025-02-19 | Resolved: 2025-02-25

## 2025-02-25 PROBLEM — K85.90 ACUTE PANCREATITIS, UNSPECIFIED COMPLICATION STATUS, UNSPECIFIED PANCREATITIS TYPE: Status: RESOLVED | Noted: 2025-02-19 | Resolved: 2025-02-25

## 2025-02-25 LAB
BACTERIA BLD CULT: NO GROWTH
BACTERIA BLD CULT: NO GROWTH
CREAT SERPL-MCNC: 0.59 MG/DL (ref 0.67–1.17)
EGFRCR SERPLBLD CKD-EPI 2021: >90 ML/MIN/1.73M2
ERYTHROCYTE [DISTWIDTH] IN BLOOD BY AUTOMATED COUNT: 12.9 % (ref 10–15)
HCT VFR BLD AUTO: 38.1 % (ref 40–53)
HGB BLD-MCNC: 13.1 G/DL (ref 13.3–17.7)
MCH RBC QN AUTO: 28.5 PG (ref 26.5–33)
MCHC RBC AUTO-ENTMCNC: 34.4 G/DL (ref 31.5–36.5)
MCV RBC AUTO: 83 FL (ref 78–100)
PHOSPHATE SERPL-MCNC: 3.9 MG/DL (ref 2.5–4.5)
PLATELET # BLD AUTO: 386 10E3/UL (ref 150–450)
RBC # BLD AUTO: 4.6 10E6/UL (ref 4.4–5.9)
WBC # BLD AUTO: 12.4 10E3/UL (ref 4–11)

## 2025-02-25 PROCEDURE — 99239 HOSP IP/OBS DSCHRG MGMT >30: CPT | Performed by: HOSPITALIST

## 2025-02-25 PROCEDURE — 250N000011 HC RX IP 250 OP 636: Performed by: EMERGENCY MEDICINE

## 2025-02-25 PROCEDURE — 85027 COMPLETE CBC AUTOMATED: CPT | Performed by: HOSPITALIST

## 2025-02-25 PROCEDURE — 99232 SBSQ HOSP IP/OBS MODERATE 35: CPT | Performed by: INTERNAL MEDICINE

## 2025-02-25 PROCEDURE — 36415 COLL VENOUS BLD VENIPUNCTURE: CPT | Performed by: HOSPITALIST

## 2025-02-25 PROCEDURE — 250N000013 HC RX MED GY IP 250 OP 250 PS 637: Performed by: EMERGENCY MEDICINE

## 2025-02-25 PROCEDURE — 250N000011 HC RX IP 250 OP 636: Performed by: INTERNAL MEDICINE

## 2025-02-25 PROCEDURE — 36415 COLL VENOUS BLD VENIPUNCTURE: CPT | Performed by: EMERGENCY MEDICINE

## 2025-02-25 PROCEDURE — 250N000013 HC RX MED GY IP 250 OP 250 PS 637: Performed by: FAMILY MEDICINE

## 2025-02-25 PROCEDURE — 84100 ASSAY OF PHOSPHORUS: CPT | Performed by: FAMILY MEDICINE

## 2025-02-25 PROCEDURE — 82565 ASSAY OF CREATININE: CPT | Performed by: EMERGENCY MEDICINE

## 2025-02-25 RX ORDER — METRONIDAZOLE 500 MG/1
500 TABLET ORAL 2 TIMES DAILY
Qty: 12 TABLET | Refills: 0 | Status: SHIPPED | OUTPATIENT
Start: 2025-02-25

## 2025-02-25 RX ORDER — LEVOFLOXACIN 500 MG/1
500 TABLET, FILM COATED ORAL DAILY
Qty: 6 TABLET | Refills: 0 | Status: SHIPPED | OUTPATIENT
Start: 2025-02-25

## 2025-02-25 RX ORDER — FOLIC ACID 1 MG/1
1 TABLET ORAL DAILY
Status: DISCONTINUED | OUTPATIENT
Start: 2025-02-26 | End: 2025-02-25 | Stop reason: HOSPADM

## 2025-02-25 RX ADMIN — VANCOMYCIN HYDROCHLORIDE 1000 MG: 1 INJECTION, SOLUTION INTRAVENOUS at 06:37

## 2025-02-25 RX ADMIN — CEFEPIME HYDROCHLORIDE 2 G: 2 INJECTION, POWDER, FOR SOLUTION INTRAVENOUS at 11:08

## 2025-02-25 RX ADMIN — GUAIFENESIN AND DEXTROMETHORPHAN 10 ML: 100; 10 SYRUP ORAL at 05:49

## 2025-02-25 RX ADMIN — THIAMINE HYDROCHLORIDE 100 MG: 100 INJECTION, SOLUTION INTRAMUSCULAR; INTRAVENOUS at 08:32

## 2025-02-25 RX ADMIN — FOLIC ACID 1 MG: 5 INJECTION, SOLUTION INTRAMUSCULAR; INTRAVENOUS; SUBCUTANEOUS at 08:30

## 2025-02-25 RX ADMIN — OXYCODONE HYDROCHLORIDE 5 MG: 5 TABLET ORAL at 11:08

## 2025-02-25 RX ADMIN — VANCOMYCIN HYDROCHLORIDE 1000 MG: 1 INJECTION, SOLUTION INTRAVENOUS at 14:10

## 2025-02-25 RX ADMIN — CEFEPIME HYDROCHLORIDE 2 G: 2 INJECTION, POWDER, FOR SOLUTION INTRAVENOUS at 02:18

## 2025-02-25 ASSESSMENT — ACTIVITIES OF DAILY LIVING (ADL)
ADLS_ACUITY_SCORE: 32

## 2025-02-25 NOTE — PLAN OF CARE
Problem: Pain Acute  Goal: Optimal Pain Control and Function  Outcome: Progressing  Intervention: Develop Pain Management Plan  Recent Flowsheet Documentation  Taken 2/24/2025 1623 by Tere Jean RN  Pain Management Interventions:   medication (see MAR)   emotional support   heat applied  Taken 2/24/2025 1502 by Tere Jean, RN  Pain Management Interventions:   medication offered but refused   heat applied  Intervention: Prevent or Manage Pain  Recent Flowsheet Documentation  Taken 2/24/2025 1505 by Tere Jean RN  Medication Review/Management: medications reviewed     Problem: Fever  Goal: Body Temperature in Desired Range  Outcome: Progressing     Problem: Pneumonia  Goal: Resolution of Infection Signs and Symptoms  Outcome: Progressing  Goal: Effective Oxygenation and Ventilation  Outcome: Progressing  Intervention: Promote Airway Secretion Clearance  Recent Flowsheet Documentation  Taken 2/24/2025 1505 by Tere Jean, RN  Activity Management: activity adjusted per tolerance   Goal Outcome Evaluation:       Patient alert and oriented x 4. Oxycodone given for epigastric pain and headache. Positive results reported. Temperature was 100.7; cold compress applied. Last temperature recheck was 98.7. Robitussin given for sporadic productive cough. Will monitor. Patient up independently. Family at bedside.

## 2025-02-25 NOTE — DISCHARGE SUMMARY
"[facility]  Hospitalist Discharge Summary      Date of Admission:  2/19/2025  Date of Discharge:  2/25/2025  5:26 PM  Discharging Provider: Rigo Yuan MD  Discharge Service: Hospitalist Service    Discharge Diagnoses   Fevers  LLL pneumonia  Acute gallstone pancreatitis  Bacteremia, suspected contaminant    Clinically Significant Risk Factors     # Overweight: Estimated body mass index is 25.21 kg/m  as calculated from the following:    Height as of this encounter: 1.727 m (5' 8\").    Weight as of this encounter: 75.2 kg (165 lb 12.6 oz).       Follow-ups Needed After Discharge   Follow-up Appointments       Hospital to Primary Care - Establish PCP Referral      Please be aware that coverage of these services is subject to the terms and limitations of your health insurance plan.  Call member services at your health plan with any benefit or coverage questions.    Schedule Primary Care visit within: 30 Days       Follow Up      Follow up with general surgery, within 2-3 weeks. for hospital follow- up. No follow up labs or test are needed. They will arrange gall bladder surgery.              Follow-up to ensure he has surgical plan in place for CCY.    Unresulted Labs Ordered in the Past 30 Days of this Admission       Date and Time Order Name Status Description    2/23/2025  9:51 AM Blood Culture Arm, Right Preliminary     2/23/2025  9:51 AM Blood Culture Arm, Right Preliminary         These results will be followed up by hospital medicine.    Discharge Disposition   Discharged to home  Condition at discharge: Stable    Hospital Course   24-year-old male admitted with acute gallstone pancreatitis with cholelithiasis and ascites and hepatic steatosis.  US with stones and ultrasound no evidence cholecystitis.  Hospitalization prolonged by fevers with chest x-ray showing left lower lobe pneumonia. .  Will also pursue right upper quadrant ultrasound to  look for development of cholecystitis. "  Surgery following and planning for outpatient cholecystectomy.  GI signed off.     LLLCAP  ---concerning as was on zosyn since admit  ---had work up due to  fevers daily last 2-3 days,   ---. White count has trended down to 15.6 from 25.5 on admission..    ---Blood cultures per below  ---2/23 did switch from Zosyn 3.125 dosage to cefepime and vancomycin, discharged on levofloxacin and flagyl per ID  ---Appreicate ID.    ---not hypoxic. .     Acute gallstone pancreatitis:   --- GI suspects passed CBD stone   --- Symptoms improved   ---repeat US with stable extrahepatic biliary dilatation, no cholecystitis  ---this could also be causing fever however appears less likely   ---Underwent EUS on February 20 showing multiple stones visualized in the gallbladder body, no choledocholithiasis  ---Gallbladder ultrasound on admission showed no evidence of cholecystitis.   --- lipase which was greater than 3000 on admission and now at 54 today  ---tolerating low fat diet since Saturday  ---surgery last saw 2/22 and planning op cholecystectomy, this has been scheduled prior for March  ---discharge on levafloxin and flagyl per ID  ---tolerating diet w/o pain or nausea day of discharge    3. Right pleural effusion  ---suspect related to gallstone pancreatitis  ---monitor on same abx for now      Bacteremia:   ----Blood cultures positive for Strep Salivarus which is likely contaminant.  ---Vancomycin stopped on February 21, but now restarted as above.  --ID involved    Consultations This Hospital Stay   GASTROENTEROLOGY IP CONSULT  GASTROENTEROLOGY IP CONSULT  PHARMACY TO DOSE VANCO  INFECTIOUS DISEASES IP CONSULT  PHARMACY TO DOSE VANCO  INFECTIOUS DISEASES IP CONSULT    Code Status   Prior    Time Spent on this Encounter   I, Rigo Yuan MD, personally saw the patient today and spent greater than 30 minutes discharging this patient.       Rigo Yuan MD  [facility]  [address]  [facility] [address]  Phone: [phone]  Fax: [phone]  ______________________________________________________________________    Physical Exam   Vital Signs:                    Weight: 165 lbs 12.57 oz  Well appearing, minimal tenderness RUQ, breathing non labored, HR normal, legs w/o edema, normal affect       Primary Care Physician   Physician No Ref-Primary    Discharge Orders      Hospital to Primary Care - Establish PCP Referral      Reason for your hospital stay    Gall stone causing pancreatitis.     Activity    Your activity upon discharge: activity as tolerated     Follow Up    Follow up with general surgery, within 2-3 weeks. for hospital follow- up. No follow up labs or test are needed. They will arrange gall bladder surgery.     Discharge Instructions    Continue pill antibiotics for another six days. Follow-up with surgery for gall bladder removal next month.     Diet    Follow this diet upon discharge: Current Diet:Orders Placed This Encounter      Low Fat Diet (up to 50g)       Significant Results and Procedures   Most Recent 3 CBC's:  Recent Labs   Lab Test 02/25/25  0818 02/24/25  0535 02/23/25  0652   WBC 12.4* 12.7* 15.6*   HGB 13.1* 12.2* 12.8*   MCV 83 85 83    349 322     Most Recent 3 BMP's:  Recent Labs   Lab Test 02/25/25  0652 02/24/25  0535 02/23/25  0619 02/22/25  0524   NA  --  136 136 139   POTASSIUM  --  3.8 3.8 3.9   CHLORIDE  --  101 101 103   CO2  --  24 27 25   BUN  --  8.0 7.3 8.1   CR 0.59* 0.62* 0.62* 0.59*   ANIONGAP  --  11 8 11   SUSANA  --  9.0 9.0 8.6*   GLC  --  94 102* 89     Most Recent 2 LFT's:  Recent Labs   Lab Test 02/24/25  0535 02/23/25  0619   AST 17 17   ALT 89* 119*   ALKPHOS 77 86   BILITOTAL 0.6 0.6   ,   Results for orders placed or performed during the hospital encounter of 02/19/25   CT Abdomen Pelvis w Contrast    Narrative    EXAM: CT ABDOMEN PELVIS W CONTRAST  LOCATION: [facility]  DATE:  2/19/2025    INDICATION: epigastric abd pain  COMPARISON: None.  TECHNIQUE: CT scan of the abdomen and pelvis was performed following injection of IV contrast. Multiplanar reformats were obtained. Dose reduction techniques were used.  CONTRAST: isovue 370 90ml    FINDINGS:   LOWER CHEST: Normal.    HEPATOBILIARY: Mild intrahepatic biliary dilation. Mild extrahepatic biliary dilation measuring non-10 mm. No calcified gallstones or biliary ductal stones. No suspicious liver lesion.    PANCREAS: Moderate ascites in the upper abdomen centered around the pancreas. Mild pancreatic edema but homogeneous enhancement. No pancreatic mass or calcification. No ductal dilation.    SPLEEN: Normal.    ADRENAL GLANDS: Normal.    KIDNEYS/BLADDER: Normal.    BOWEL: Moderate upper abdominal ascites. Trace free fluid in the pelvis. No free air. Small bowel normal in caliber. No bowel wall thickening or abnormal enhancement. Normal caliber appendix.    LYMPH NODES: Normal.    VASCULATURE: Normal.    PELVIC ORGANS: Normal.    MUSCULOSKELETAL: Normal.      Impression    IMPRESSION:   1.  Acute (interstitial edematous) pancreatitis without evidence for necrosis.  2.  Moderate ascites.  3.  Mild intrahepatic and extrahepatic biliary dilation. No calcified gallstones are ductal stones. Consider follow-up right upper quadrant ultrasound for further evaluation.    US Abdomen Limited    Narrative    EXAM: US ABDOMEN LIMITED  LOCATION: [facility]  DATE: 2/19/2025    INDICATION: pancreatitis  COMPARISON: CT 02/19/2025  TECHNIQUE: Limited abdominal ultrasound.    FINDINGS:    GALLBLADDER: Cholelithiasis. No visible wall thickening. Sonographic Early's sign negative. Mild gallbladder distention.    BILE DUCTS: Mild extra hepatic biliary dilatation. Common duct measures 8 mm.    LIVER: Hepatic steatosis.    RIGHT KIDNEY: No hydronephrosis.    PANCREAS: Partial obscuration by bowel gas. Changes of pancreatitis better  demonstrated on CT.      Impression    IMPRESSION:  1.  Cholelithiasis and mild gallbladder distention.  2.  Sonographic Early's sign negative.  3.  Changes of pancreatitis better demonstrated on CT.       XR Chest 1 View    Narrative    EXAM: XR CHEST 1 VIEW  LOCATION: [facility]  DATE: 2/19/2025    INDICATION: weak  COMPARISON: None.      Impression    IMPRESSION: Negative chest.   XR Chest Port 1 View    Narrative    EXAM: XR CHEST PORT 1 VIEW  LOCATION: [facility]  DATE: 2/23/2025    INDICATION: fever, cough  COMPARISON: Chest radiograph 2/19/2025      Impression    IMPRESSION: Left lower lobe pneumonia.   US Abdomen Limited    Narrative    EXAM: US ABDOMEN LIMITED  LOCATION: [facility]  DATE: 2/23/2025    INDICATION: Midline abdominal pain.  COMPARISON: 2/19/2025  TECHNIQUE: Limited abdominal ultrasound.    FINDINGS:    GALLBLADDER: Shadowing gallstones. No gallbladder wall thickening or pericholecystic fluid. Negative sonographic Early's sign.    BILE DUCTS: The common duct measures 8 mm. No intrahepatic biliary dilatation identified.    LIVER: Normal parenchyma with smooth contour. No focal mass. The portal vein is patent with flow in the normal direction.    RIGHT KIDNEY: No hydronephrosis.    PANCREAS: The visualized portions are normal.    No ascites.    Incidental note of right pleural effusion.      Impression    IMPRESSION:  1.  Cholelithiasis. No evidence of acute cholecystitis.    2.  Redemonstrated extrahepatic biliary dilatation. If clinical concern for choledocholithiasis, consider MRCP.    3.  Incidental note of right pleural effusion.           Discharge Medications   Discharge Medication List as of 2/25/2025  5:15 PM        START taking these medications    Details   levofloxacin (LEVAQUIN) 500 MG tablet Take 1 tablet (500 mg) by mouth daily., Disp-6 tablet, R-0, E-Prescribe      metroNIDAZOLE (FLAGYL) 500 MG tablet  Take 1 tablet (500 mg) by mouth 2 times daily., Disp-12 tablet, R-0, E-Prescribe           CONTINUE these medications which have NOT CHANGED    Details   ibuprofen (ADVIL/MOTRIN) 200 MG tablet Take 800 mg by mouth every 8 hours as needed for pain., Historical      omeprazole (PRILOSEC) 20 MG DR capsule Take 1 capsule (20 mg) by mouth daily., Disp-14 capsule, R-0, E-Prescribe      sucralfate (CARAFATE) 1 GM tablet Take 1 g by mouth 2 times daily., Historical           Allergies   No Known Allergies

## 2025-02-25 NOTE — PLAN OF CARE
Goal Outcome Evaluation:    Pt did complain of upper, medial abdominal pain today. PRN Oxycodone given with relief. No nausea noted. GI following. Afebrile. IV abx given. ID following. Lung sounds diminished. Infrequent, congested cough noted. Pt is alert and orientated. Up independently with transfers and ambulation. Family at bedside all day. Plan is for pt to hopefully discharge later tonight. Will continue to monitor.

## 2025-02-25 NOTE — PROGRESS NOTES
"[facility] Progress Note    SUBJECTIVE:  Pt improving.  Mother here.  Belly softer.  Able to eat.        REVIEW OF SYSTEMS:  Negative unless as listed above.  Social history, Family history, Medications: reviewed.    OBJECTIVE:  /71   Pulse 82   Temp 98.9  F (37.2  C) (Oral)   Resp 17   Ht 1.727 m (5' 8\")   Wt 75.2 kg (165 lb 12.6 oz)   SpO2 96%   BMI 25.21 kg/m                PHYSICAL EXAM:  Alert, awake  Vitals tabulated above, reviewed  Neck supple without lymphadenopathy  Sclera normal color, not injected  CARDIOVASCULAR regular rate and rhythm, no murmur  Lungs CLEAR TO AUSCULTATION   Abdomen soft, NT/ND, absent HEPATOSPLENOMEGALY  Skin normal  Joints normal  Neurologic exam non focal    Antibiotics:  CF/V  Pertinent labs:    Recent Labs   Lab Test 02/25/25  0818 02/24/25  0535 02/23/25  0652   WBC 12.4* 12.7* 15.6*   HGB 13.1* 12.2* 12.8*   HCT 38.1* 37.0* 37.4*   MCV 83 85 83    349 322       Lab Results   Component Value Date    RBC 4.60 02/25/2025     Lab Results   Component Value Date    HGB 13.1 02/25/2025     Lab Results   Component Value Date    HCT 38.1 02/25/2025     No components found for: \"MCT\"  Lab Results   Component Value Date    MCV 83 02/25/2025     Lab Results   Component Value Date    MCH 28.5 02/25/2025     Lab Results   Component Value Date    MCHC 34.4 02/25/2025     Lab Results   Component Value Date    RDW 12.9 02/25/2025     Lab Results   Component Value Date     02/25/2025       Last Comprehensive Metabolic Panel:  Sodium   Date Value Ref Range Status   02/24/2025 136 135 - 145 mmol/L Final     Potassium   Date Value Ref Range Status   02/24/2025 3.8 3.4 - 5.3 mmol/L Final     Chloride   Date Value Ref Range Status   02/24/2025 101 98 - 107 mmol/L Final     Carbon Dioxide (CO2)   Date Value Ref Range Status   02/24/2025 24 22 - 29 mmol/L Final     Anion Gap   Date Value Ref Range Status   02/24/2025 11 7 - 15 mmol/L Final     Glucose   Date " "Value Ref Range Status   02/24/2025 94 70 - 99 mg/dL Final     GLUCOSE BY METER POCT   Date Value Ref Range Status   02/20/2025 112 (H) 70 - 99 mg/dL Final     Urea Nitrogen   Date Value Ref Range Status   02/24/2025 8.0 6.0 - 20.0 mg/dL Final     Creatinine   Date Value Ref Range Status   02/25/2025 0.59 (L) 0.67 - 1.17 mg/dL Final     GFR Estimate   Date Value Ref Range Status   02/25/2025 >90 >60 mL/min/1.73m2 Final     Comment:     eGFR calculated using 2021 CKD-EPI equation.     Calcium   Date Value Ref Range Status   02/24/2025 9.0 8.8 - 10.4 mg/dL Final       Liver Function Studies -   Recent Labs   Lab Test 02/24/25  0535   PROTTOTAL 6.7   ALBUMIN 3.5   BILITOTAL 0.6   ALKPHOS 77   AST 17   ALT 89*       No results found for: \"SED\"    No results found for: \"CRP\"            MICROBIOLOGY DATA:  Neg, single + BC is contam      IMAGING/RADIOLOGY:          ASSESSMENT:  Pancreatitis, mild if any PNA    RECOMMENDATION:  Home, today  Levaquin 500 daily  Flagyl 500 BID  Both PO and both for 6 more days    LOIDA Vences MD  Office [phone] option 2 to desk staff  "

## 2025-02-25 NOTE — PLAN OF CARE
Goal Outcome Evaluation:    Pt A/O x4. Denies pain, endorses cramping in abdomen. Declined pain medications and asked for hot packs. PRN robitussen given for cough. LS are diminished. NS infusing 50 mL/hr. Phos protocol, recheck this AM. Afebrile this shift. Antibiotics infused per orders. Low fat diet. Independent in room. Sister stayed the night. VSS.    Temp: 98.1  F (36.7  C) Temp src: Oral BP: 130/68 Pulse: 76   Resp: 18 SpO2: 94 % O2 Device: None (Room air)

## 2025-02-27 LAB
BACTERIA BLD CULT: NORMAL
BACTERIA BLD CULT: NORMAL

## 2025-02-28 LAB
BACTERIA BLD CULT: NO GROWTH
BACTERIA BLD CULT: NO GROWTH

## 2025-03-07 ENCOUNTER — ANESTHESIA EVENT (OUTPATIENT)
Dept: SURGERY | Facility: AMBULATORY SURGERY CENTER | Age: 25
End: 2025-03-07
Payer: COMMERCIAL

## 2025-03-10 ENCOUNTER — HOSPITAL ENCOUNTER (OUTPATIENT)
Facility: AMBULATORY SURGERY CENTER | Age: 25
Discharge: HOME | End: 2025-03-10
Attending: SPECIALIST
Payer: COMMERCIAL

## 2025-03-10 ENCOUNTER — ANESTHESIA (OUTPATIENT)
Dept: SURGERY | Facility: AMBULATORY SURGERY CENTER | Age: 25
End: 2025-03-10
Payer: COMMERCIAL

## 2025-03-10 VITALS
TEMPERATURE: 97.7 F | WEIGHT: 167 LBS | SYSTOLIC BLOOD PRESSURE: 117 MMHG | HEART RATE: 68 BPM | BODY MASS INDEX: 25.31 KG/M2 | DIASTOLIC BLOOD PRESSURE: 63 MMHG | HEIGHT: 68 IN | RESPIRATION RATE: 16 BRPM | OXYGEN SATURATION: 95 %

## 2025-03-10 DIAGNOSIS — K85.10 GALLSTONE PANCREATITIS: ICD-10-CM

## 2025-03-10 PROCEDURE — 47562 LAPAROSCOPIC CHOLECYSTECTOMY: CPT | Performed by: SPECIALIST

## 2025-03-10 RX ORDER — CEFAZOLIN SODIUM 2 G/100ML
2 INJECTION, SOLUTION INTRAVENOUS SEE ADMIN INSTRUCTIONS
Status: DISCONTINUED | OUTPATIENT
Start: 2025-03-10 | End: 2025-03-11 | Stop reason: HOSPADM

## 2025-03-10 RX ORDER — LIDOCAINE 40 MG/G
CREAM TOPICAL
Status: DISCONTINUED | OUTPATIENT
Start: 2025-03-10 | End: 2025-03-11 | Stop reason: HOSPADM

## 2025-03-10 RX ORDER — DEXAMETHASONE SODIUM PHOSPHATE 4 MG/ML
4 INJECTION, SOLUTION INTRA-ARTICULAR; INTRALESIONAL; INTRAMUSCULAR; INTRAVENOUS; SOFT TISSUE
Status: DISCONTINUED | OUTPATIENT
Start: 2025-03-10 | End: 2025-03-11 | Stop reason: HOSPADM

## 2025-03-10 RX ORDER — NALOXONE HYDROCHLORIDE 0.4 MG/ML
0.1 INJECTION, SOLUTION INTRAMUSCULAR; INTRAVENOUS; SUBCUTANEOUS
Status: DISCONTINUED | OUTPATIENT
Start: 2025-03-10 | End: 2025-03-11 | Stop reason: HOSPADM

## 2025-03-10 RX ORDER — FENTANYL CITRATE 0.05 MG/ML
25 INJECTION, SOLUTION INTRAMUSCULAR; INTRAVENOUS EVERY 5 MIN PRN
Status: DISCONTINUED | OUTPATIENT
Start: 2025-03-10 | End: 2025-03-11 | Stop reason: HOSPADM

## 2025-03-10 RX ORDER — HYDROCODONE BITARTRATE AND ACETAMINOPHEN 5; 325 MG/1; MG/1
1 TABLET ORAL
Status: DISCONTINUED | OUTPATIENT
Start: 2025-03-10 | End: 2025-03-11 | Stop reason: HOSPADM

## 2025-03-10 RX ORDER — HYDROCODONE BITARTRATE AND ACETAMINOPHEN 5; 325 MG/1; MG/1
1-2 TABLET ORAL EVERY 4 HOURS PRN
Qty: 12 TABLET | Refills: 0 | Status: SHIPPED | OUTPATIENT
Start: 2025-03-10

## 2025-03-10 RX ORDER — CEFAZOLIN SODIUM 2 G/100ML
2 INJECTION, SOLUTION INTRAVENOUS
Status: COMPLETED | OUTPATIENT
Start: 2025-03-10 | End: 2025-03-10

## 2025-03-10 RX ORDER — ONDANSETRON 4 MG/1
4 TABLET, ORALLY DISINTEGRATING ORAL EVERY 30 MIN PRN
Status: DISCONTINUED | OUTPATIENT
Start: 2025-03-10 | End: 2025-03-11 | Stop reason: HOSPADM

## 2025-03-10 RX ORDER — OXYCODONE HYDROCHLORIDE 5 MG/1
5 TABLET ORAL
Status: COMPLETED | OUTPATIENT
Start: 2025-03-10 | End: 2025-03-10

## 2025-03-10 RX ORDER — OXYCODONE HYDROCHLORIDE 10 MG/1
10 TABLET ORAL
Status: DISCONTINUED | OUTPATIENT
Start: 2025-03-10 | End: 2025-03-11 | Stop reason: HOSPADM

## 2025-03-10 RX ORDER — FENTANYL CITRATE 0.05 MG/ML
50 INJECTION, SOLUTION INTRAMUSCULAR; INTRAVENOUS EVERY 5 MIN PRN
Status: DISCONTINUED | OUTPATIENT
Start: 2025-03-10 | End: 2025-03-11 | Stop reason: HOSPADM

## 2025-03-10 RX ORDER — FENTANYL CITRATE 50 UG/ML
INJECTION, SOLUTION INTRAMUSCULAR; INTRAVENOUS PRN
Status: DISCONTINUED | OUTPATIENT
Start: 2025-03-10 | End: 2025-03-10

## 2025-03-10 RX ORDER — ONDANSETRON 2 MG/ML
4 INJECTION INTRAMUSCULAR; INTRAVENOUS EVERY 30 MIN PRN
Status: DISCONTINUED | OUTPATIENT
Start: 2025-03-10 | End: 2025-03-11 | Stop reason: HOSPADM

## 2025-03-10 RX ORDER — SODIUM CHLORIDE, SODIUM LACTATE, POTASSIUM CHLORIDE, CALCIUM CHLORIDE 600; 310; 30; 20 MG/100ML; MG/100ML; MG/100ML; MG/100ML
INJECTION, SOLUTION INTRAVENOUS CONTINUOUS
Status: DISCONTINUED | OUTPATIENT
Start: 2025-03-10 | End: 2025-03-11 | Stop reason: HOSPADM

## 2025-03-10 RX ORDER — BUPIVACAINE HYDROCHLORIDE 2.5 MG/ML
INJECTION, SOLUTION INFILTRATION; PERINEURAL PRN
Status: DISCONTINUED | OUTPATIENT
Start: 2025-03-10 | End: 2025-03-10 | Stop reason: HOSPADM

## 2025-03-10 RX ORDER — KETOROLAC TROMETHAMINE 30 MG/ML
INJECTION, SOLUTION INTRAMUSCULAR; INTRAVENOUS PRN
Status: DISCONTINUED | OUTPATIENT
Start: 2025-03-10 | End: 2025-03-10

## 2025-03-10 RX ORDER — DEXAMETHASONE SODIUM PHOSPHATE 10 MG/ML
INJECTION, SOLUTION INTRAMUSCULAR; INTRAVENOUS PRN
Status: DISCONTINUED | OUTPATIENT
Start: 2025-03-10 | End: 2025-03-10

## 2025-03-10 RX ORDER — PROPOFOL 10 MG/ML
INJECTION, EMULSION INTRAVENOUS CONTINUOUS PRN
Status: DISCONTINUED | OUTPATIENT
Start: 2025-03-10 | End: 2025-03-10

## 2025-03-10 RX ORDER — HYDROMORPHONE HCL IN WATER/PF 6 MG/30 ML
0.4 PATIENT CONTROLLED ANALGESIA SYRINGE INTRAVENOUS EVERY 5 MIN PRN
Status: DISCONTINUED | OUTPATIENT
Start: 2025-03-10 | End: 2025-03-11 | Stop reason: HOSPADM

## 2025-03-10 RX ORDER — HYDROMORPHONE HCL IN WATER/PF 6 MG/30 ML
0.2 PATIENT CONTROLLED ANALGESIA SYRINGE INTRAVENOUS EVERY 5 MIN PRN
Status: DISCONTINUED | OUTPATIENT
Start: 2025-03-10 | End: 2025-03-11 | Stop reason: HOSPADM

## 2025-03-10 RX ORDER — ACETAMINOPHEN 325 MG/1
975 TABLET ORAL ONCE
Status: COMPLETED | OUTPATIENT
Start: 2025-03-10 | End: 2025-03-10

## 2025-03-10 RX ORDER — IBUPROFEN 600 MG/1
600 TABLET, FILM COATED ORAL
Status: DISCONTINUED | OUTPATIENT
Start: 2025-03-10 | End: 2025-03-11 | Stop reason: HOSPADM

## 2025-03-10 RX ORDER — ONDANSETRON 2 MG/ML
INJECTION INTRAMUSCULAR; INTRAVENOUS PRN
Status: DISCONTINUED | OUTPATIENT
Start: 2025-03-10 | End: 2025-03-10

## 2025-03-10 RX ORDER — LIDOCAINE HYDROCHLORIDE 20 MG/ML
INJECTION, SOLUTION INFILTRATION; PERINEURAL PRN
Status: DISCONTINUED | OUTPATIENT
Start: 2025-03-10 | End: 2025-03-10

## 2025-03-10 RX ADMIN — KETOROLAC TROMETHAMINE 15 MG: 30 INJECTION, SOLUTION INTRAMUSCULAR; INTRAVENOUS at 07:43

## 2025-03-10 RX ADMIN — CEFAZOLIN SODIUM 2 G: 2 INJECTION, SOLUTION INTRAVENOUS at 07:10

## 2025-03-10 RX ADMIN — DEXAMETHASONE SODIUM PHOSPHATE 8 MG: 10 INJECTION, SOLUTION INTRAMUSCULAR; INTRAVENOUS at 07:10

## 2025-03-10 RX ADMIN — OXYCODONE HYDROCHLORIDE 5 MG: 5 TABLET ORAL at 09:17

## 2025-03-10 RX ADMIN — LIDOCAINE HYDROCHLORIDE 3 ML: 20 INJECTION, SOLUTION INFILTRATION; PERINEURAL at 07:08

## 2025-03-10 RX ADMIN — PROPOFOL 200 MCG/KG/MIN: 10 INJECTION, EMULSION INTRAVENOUS at 07:08

## 2025-03-10 RX ADMIN — ONDANSETRON 4 MG: 2 INJECTION INTRAMUSCULAR; INTRAVENOUS at 07:11

## 2025-03-10 RX ADMIN — SODIUM CHLORIDE, SODIUM LACTATE, POTASSIUM CHLORIDE, CALCIUM CHLORIDE: 600; 310; 30; 20 INJECTION, SOLUTION INTRAVENOUS at 08:16

## 2025-03-10 RX ADMIN — SODIUM CHLORIDE, SODIUM LACTATE, POTASSIUM CHLORIDE, CALCIUM CHLORIDE: 600; 310; 30; 20 INJECTION, SOLUTION INTRAVENOUS at 06:31

## 2025-03-10 RX ADMIN — FENTANYL CITRATE 50 MCG: 50 INJECTION, SOLUTION INTRAMUSCULAR; INTRAVENOUS at 07:08

## 2025-03-10 RX ADMIN — FENTANYL CITRATE 50 MCG: 0.05 INJECTION, SOLUTION INTRAMUSCULAR; INTRAVENOUS at 08:30

## 2025-03-10 RX ADMIN — ACETAMINOPHEN 975 MG: 325 TABLET ORAL at 06:31

## 2025-03-10 RX ADMIN — FENTANYL CITRATE 50 MCG: 50 INJECTION, SOLUTION INTRAMUSCULAR; INTRAVENOUS at 07:30

## 2025-03-10 RX ADMIN — Medication 50 MG: at 07:08

## 2025-03-10 NOTE — ANESTHESIA POSTPROCEDURE EVALUATION
Patient: Julio Stoll    Procedure: Procedure(s):  CHOLECYSTECTOMY, LAPAROSCOPIC       Anesthesia Type:  General    Note:  Disposition: Outpatient   Postop Pain Control: Uneventful            Sign Out: Well controlled pain   PONV: No   Neuro/Psych: Uneventful            Sign Out: Acceptable/Baseline neuro status   Airway/Respiratory: Uneventful            Sign Out: Acceptable/Baseline resp. status   CV/Hemodynamics: Uneventful            Sign Out: Acceptable CV status; No obvious hypovolemia; No obvious fluid overload   Other NRE: NONE   DID A NON-ROUTINE EVENT OCCUR? No    Event details/Postop Comments:  Patient recovering comfortably.        Last vitals:  Vitals Value Taken Time   /60 03/10/25 0845   Temp 96.8  F (36  C) 03/10/25 0815   Pulse     Resp 16 03/10/25 0845   SpO2 98 % 03/10/25 0845       Electronically Signed By: Fareed Barkley DO  March 10, 2025  9:35 AM

## 2025-03-10 NOTE — ANESTHESIA CARE TRANSFER NOTE
Patient: Julio Stoll    Procedure: Procedure(s):  CHOLECYSTECTOMY, LAPAROSCOPIC       Diagnosis: Gallstone pancreatitis [K85.10]  Diagnosis Additional Information: No value filed.    Anesthesia Type:   General     Note:    Oropharynx: oropharynx clear of all foreign objects and spontaneously breathing  Level of Consciousness: drowsy  Oxygen Supplementation: face mask  Level of Supplemental Oxygen (L/min / FiO2): 8  Independent Airway: airway patency satisfactory and stable  Dentition: dentition unchanged  Vital Signs Stable: post-procedure vital signs reviewed and stable  Report to RN Given: handoff report given  Patient transferred to: PACU  Comments: VSS.   Handoff Report: Identifed the Patient, Identified the Reponsible Provider, Reviewed the pertinent medical history, Discussed the surgical course, Reviewed Intra-OP anesthesia mangement and issues during anesthesia, Set expectations for post-procedure period and Allowed opportunity for questions and acknowledgement of understanding      Vitals:  Vitals Value Taken Time   BP     Temp     Pulse     Resp     SpO2         Electronically Signed By: DONNY Schuster CRNA  March 10, 2025  8:04 AM

## 2025-03-10 NOTE — ANESTHESIA PROCEDURE NOTES
Airway       Patient location during procedure: OR       Procedure Start/Stop Times: 3/10/2025 7:08 AM  Staff -        CRNA: Ayde Fraser APRN CRNA       Performed By: CRNA  Consent for Airway        Urgency: elective  Indications and Patient Condition       Indications for airway management: jesus-procedural       Induction type:intravenous       Mask difficulty assessment: 1 - vent by mask    Final Airway Details       Final airway type: endotracheal airway       Successful airway: ETT - single and Oral  Endotracheal Airway Details        ETT size (mm): 7.5       Cuffed: yes       Successful intubation technique: direct laryngoscopy       DL Blade Type: Mathews 2       Grade View of Cords: 2       Adjucts: stylet       Position: Right       Measured from: gums/teeth       Secured at (cm): 22       Bite block used: None    Post intubation assessment        Placement verified by: capnometry, equal breath sounds and chest rise        Number of attempts at approach: 1       Secured with: silk tape       Ease of procedure: easy       Dentition: Intact and Unchanged       Dental guard used and removed.    Medication(s) Administered   Medication Administration Time: 3/10/2025 7:08 AM

## 2025-03-10 NOTE — INTERVAL H&P NOTE
"I have reviewed the surgical (or preoperative) H&P that is linked to this encounter, and examined the patient. There are no significant changes    Patient recovered from his episode of gallstone pancreatitis.  Now presents for laparoscopic cholecystectomy.  He is now feeling well.    Clinical Conditions Present on Arrival:  Clinically Significant Risk Factors Present on Admission            # Hypocalcemia: Lowest Ca = 8.6 mg/dL in last 30 days, will monitor and replace as appropriate    # Hypoalbuminemia: Lowest albumin = 3.3 g/dL in the past 30 days , will monitor as appropriate         # Overweight: Estimated body mass index is 25.39 kg/m  as calculated from the following:    Height as of this encounter: 1.727 m (5' 8\").    Weight as of this encounter: 75.8 kg (167 lb).       "

## 2025-03-10 NOTE — DISCHARGE INSTRUCTIONS
If you have any questions or concerns regarding your procedure please contact Dr. Jerry, her office number is [phone].    You have received 975 mg of Acetaminophen (Tylenol) at 6:30 AM. Please do not take an additional dose of Tylenol until after 12:30 PM.     Do not exceed 4,000 mg of acetaminophen during a 24 hour period and keep in mind that acetaminophen can also be found in many over-the-counter cold medications as well as narcotics that may be given for pain.       You received a medication called Toradol (a stronger IV ibuprofen) at 7:45 AM. Do NOT take any Ibuprofen / Advil / Motrin / Aleve / Naproxen or products containing Ibuprofen until 1:45 PM or later.     [facility]   Adult Discharge Orders & Instructions     For 24 hours after surgery    Get plenty of rest.  A responsible adult must stay with you for at least 24 hours after you leave the hospital.   Do not drive or use heavy equipment.  If you have weakness or tingling, don't drive or use heavy equipment until this feeling goes away.  Do not drink alcohol.  Avoid strenuous or risky activities.  Ask for help when climbing stairs.   You may feel lightheaded.  IF so, sit for a few minutes before standing.  Have someone help you get up.   If you have nausea (feel sick to your stomach): Drink only clear liquids such as apple juice, ginger ale, broth or 7-Up.  Rest may also help.  Be sure to drink enough fluids.  Move to a regular diet as you feel able.  You may have a slight fever. Call the doctor if your fever is over 100 F (37.7 C) (taken under the tongue) or lasts longer than 24 hours.  You may have a dry mouth, a sore throat, muscle aches or trouble sleeping.  These should go away after 24 hours.  Do not make important or legal decisions.   Call your doctor for any of the followin.  Signs of infection (fever, growing tenderness at the surgery site, a large amount of drainage or bleeding, severe pain, foul-smelling drainage,  redness, swelling).    2. It has been over 8 to 10 hours since surgery and you are still not able to urinate (pass water).    3.  Headache for over 24 hours.

## 2025-03-10 NOTE — ANESTHESIA PREPROCEDURE EVALUATION
Anesthesia Pre-Procedure Evaluation    Patient: Julio Stoll   MRN: [id number] : 2000        Procedure : Procedure(s):  CHOLECYSTECTOMY, LAPAROSCOPIC          Past Medical History:   Diagnosis Date     Gastroesophageal reflux disease       Past Surgical History:   Procedure Laterality Date     ENT SURGERY       ESOPHAGOSCOPY, GASTROSCOPY, DUODENOSCOPY (EGD), COMBINED N/A 2025    Procedure: ESOPHAGOGASTRODUODENOSCOPY, WITH ENDOSCOPIC ULTRASOUND GUIDANCE;  Surgeon: Nhan Romano MD;  Location: [facility] - [place] OR      Allergies   Allergen Reactions     Dust Mite Extract Shortness Of Breath      Social History     Tobacco Use     Smoking status: Never     Passive exposure: Past     Smokeless tobacco: Former   Substance Use Topics     Alcohol use: Not Currently      Wt Readings from Last 1 Encounters:   25 75.8 kg (167 lb)        Anesthesia Evaluation            ROS/MED HX  ENT/Pulmonary:       Neurologic:       Cardiovascular:       METS/Exercise Tolerance:     Hematologic:       Musculoskeletal:       GI/Hepatic:     (+) GERD,                   Renal/Genitourinary:       Endo:       Psychiatric/Substance Use:       Infectious Disease:       Malignancy:       Other:          Physical Exam    Airway        Mallampati: II   TM distance: > 3 FB   Neck ROM: full     Respiratory Devices and Support         Dental       (+) Minor Abnormalities - some fillings, tiny chips      Cardiovascular   cardiovascular exam normal          Pulmonary   pulmonary exam normal            OUTSIDE LABS:  CBC:   Lab Results   Component Value Date    WBC 12.4 (H) 2025    WBC 12.7 (H) 2025    HGB 13.1 (L) 2025    HGB 12.2 (L) 2025    HCT 38.1 (L) 2025    HCT 37.0 (L) 2025     2025     2025     BMP:   Lab Results   Component Value Date     2025     2025    POTASSIUM 3.8 2025    POTASSIUM 3.8 2025    CHLORIDE 101 2025     "CHLORIDE 101 02/23/2025    CO2 24 02/24/2025    CO2 27 02/23/2025    BUN 8.0 02/24/2025    BUN 7.3 02/23/2025    CR 0.59 (L) 02/25/2025    CR 0.62 (L) 02/24/2025    GLC 94 02/24/2025     (H) 02/23/2025     COAGS:   Lab Results   Component Value Date    INR 1.02 02/20/2025     POC: No results found for: \"BGM\", \"HCG\", \"HCGS\"  HEPATIC:   Lab Results   Component Value Date    ALBUMIN 3.5 02/24/2025    PROTTOTAL 6.7 02/24/2025    ALT 89 (H) 02/24/2025    AST 17 02/24/2025    ALKPHOS 77 02/24/2025    BILITOTAL 0.6 02/24/2025     OTHER:   Lab Results   Component Value Date    LACT 1.6 02/19/2025    A1C 5.5 02/19/2025    SUSANA 9.0 02/24/2025    PHOS 3.9 02/25/2025    MAG 2.2 02/20/2025    LIPASE 54 02/23/2025       Anesthesia Plan    ASA Status:  2    NPO Status:  NPO Appropriate    Anesthesia Type: General.              Consents    Anesthesia Plan(s) and associated risks, benefits, and realistic alternatives discussed. Questions answered and patient/representative(s) expressed understanding.     - Discussed: Risks, Benefits and Alternatives for BOTH SEDATION and the PROCEDURE were discussed     - Discussed with:  Patient      - Extended Intubation/Ventilatory Support Discussed: No.      - Patient is DNR/DNI Status: No     Use of blood products discussed: No .     Postoperative Care    Pain management: IV analgesics, Oral pain medications.   PONV prophylaxis: Ondansetron (or other 5HT-3), Dexamethasone or Solumedrol     Comments:             Fareed Barkley DO    I have reviewed the pertinent notes and labs in the chart from the past 30 days and (re)examined the patient.  Any updates or changes from those notes are reflected in this note.    Clinically Significant Risk Factors Present on Admission                             # Overweight: Estimated body mass index is 25.39 kg/m  as calculated from the following:    Height as of this encounter: 1.727 m (5' 8\").    Weight as of this encounter: 75.8 kg (167 lb).  "

## 2025-03-10 NOTE — OP NOTE
Operative Note    Name:  Julio Stoll  PCP:  No Ref-Primary, Physician  Procedure Date:  3/10/2025       Procedure:  Procedure(s):  CHOLECYSTECTOMY, LAPAROSCOPIC     Pre-Procedure Diagnosis:  Gallstone pancreatitis [K85.10]     Post-Procedure Diagnosis:    Same   Gallstones    Surgeon(s):  Lydia Jerry MD     Assistant: None      Anesthesia Type:  General       Findings:  Multiple stones in the gallbladder.    Operative Report:    The patient was properly identified and brought to the operating suite where they were placed in the supine position, general anesthetic was administered and prepped and draped in a sterile fashion.  A small incision was made below the umbilicus and a Veress needle passed into the abdominal cavity which was insufflated with carbon dioxide.  A 5mm trocar port was then placed followed by the camera.  Under direct vision a 10 mm port was placed in the epigastrium and two 5 mm ports in the right upper quadrant.  The gallbladder was visualized.  With traction on the gallbladder dissection was carried out in the triangle of Calot where the cystic duct and artery were carefully  identified, dissected free, clipped and divided.  The gallbladder was removed from the gallbladder bed with electrocautery with no difficulty and pulled up through the epigastric incision.  The port was replaced and the entire area irrigated until clear.  Hemostasis was assured.  All the ports removed and each site closed with subcuticular sutures of 4-0 Monocryl.  Sterile dressings were placed.  Each site was also infiltrated with quarter percent Marcaine for a total of 30 mL.  The patient tolerated the procedure well.    Estimated Blood Loss:   5cc    Specimens:    ID Type Source Tests Collected by Time Destination   1 :  Tissue Gallbladder SURGICAL PATHOLOGY EXAM Lydia Jerry MD 3/10/2025  7:38 AM            Drains:   GI Enteral Access Device Mouth, center 18 fr (Active)       Complications:    None    Lydia MAXWELL  MD [place]     Date: 3/10/2025  Time: 7:48 AM

## 2025-03-13 ENCOUNTER — TELEPHONE (OUTPATIENT)
Dept: SURGERY | Facility: CLINIC | Age: 25
End: 2025-03-13
Payer: COMMERCIAL

## 2025-03-13 NOTE — LETTER
[facility]  [address]  [place] [address]  [phone]          March 13, 2025    RE:  Julio Stoll                                                                                                                                                       [address] [facility] [address]            To whom it may concern:    Julio Stoll is under my professional care for his recent surgery on 03/10/2025. He may return to work on Monday 03/17/2025 with the following work restrictions until 03/24/2025: Avoid strenuous activity and heavy lifting, carrying, pushing or pulling more than 20 pounds. Afterwards, he can resume normal work duties as tolerated.    Please do not hesitate to reach out with any questions or concerns.        Sincerely,      Lydia Jerry MD

## 2025-03-13 NOTE — TELEPHONE ENCOUNTER
[facility] Post-Op Phone Call                     Surgeon: Lydia Jerry MD    Date of Surgery: 03/10/2025  Surgery: Laparoscopic Cholecystectomy  Discharge Date: 03/10/2025    Date/Time Called:   Date: 3/13/2025 Time: 9:08 AM   Attempt: First    Pain Control:  Intensity: Mild (1 - 3)  Duration/Location/Explain: Mild to moderate pain at incisional sites that is managed at home with rest and Tylenol and Ibuprofen.    Incisions:  Drainage? clean and dry  Any fever type symptoms? No    GI:  Nausea? No  Vomiting? No  BM? Yes  Gas? Yes  Voiding Frequency? 4 or more/day   Appetite? Good    Activity:  Walking activity? Yes  Frequency/Type: Ambulating frequently as tolerated.  Restrictions: Avoid heavy lifting over 20 lbs for 2 weeks. Return to normal activities as tolerated.    Return to Work Plans?  Expected date: 03/17/2025  Do you need anything from us in this regard?  Yes - requesting work note to RTW on 03/17/2025 with 20 lb weight restriction from 03/17-03/24. He would like to return to work in a week and have weight restrictions in place for the following week. Letter written and emailed to patient.      Post-op appointment made? No    Thank you for your time. Please do not hesitate to call us with any questions or concerns.    Call completed by: Bridget ZALDIVAR RN

## (undated) DEVICE — TUBING SUCTION MEDI-VAC 1/4"X20' N620A

## (undated) DEVICE — ESU GROUND PAD ADULT REM W/15' CORD E7507DB

## (undated) DEVICE — SUCTION MANIFOLD NEPTUNE 2 SYS 1 PORT 702-025-000

## (undated) DEVICE — SOL WATER IRRIG 1000ML BOTTLE 2F7114

## (undated) RX ORDER — DEXAMETHASONE SODIUM PHOSPHATE 10 MG/ML
INJECTION, SOLUTION INTRAMUSCULAR; INTRAVENOUS
Status: DISPENSED
Start: 2025-02-20

## (undated) RX ORDER — FENTANYL CITRATE 50 UG/ML
INJECTION, SOLUTION INTRAMUSCULAR; INTRAVENOUS
Status: DISPENSED
Start: 2025-02-20

## (undated) RX ORDER — ONDANSETRON 2 MG/ML
INJECTION INTRAMUSCULAR; INTRAVENOUS
Status: DISPENSED
Start: 2025-02-20